# Patient Record
Sex: FEMALE | Race: BLACK OR AFRICAN AMERICAN | NOT HISPANIC OR LATINO | Employment: FULL TIME | URBAN - METROPOLITAN AREA
[De-identification: names, ages, dates, MRNs, and addresses within clinical notes are randomized per-mention and may not be internally consistent; named-entity substitution may affect disease eponyms.]

---

## 2019-07-26 ENCOUNTER — OFFICE VISIT (OUTPATIENT)
Dept: OBGYN CLINIC | Facility: CLINIC | Age: 29
End: 2019-07-26
Payer: COMMERCIAL

## 2019-07-26 VITALS
SYSTOLIC BLOOD PRESSURE: 125 MMHG | DIASTOLIC BLOOD PRESSURE: 80 MMHG | WEIGHT: 205 LBS | HEIGHT: 65 IN | BODY MASS INDEX: 34.16 KG/M2

## 2019-07-26 DIAGNOSIS — N92.6 IRREGULAR MENSES: Primary | ICD-10-CM

## 2019-07-26 DIAGNOSIS — N94.10 DYSPAREUNIA IN FEMALE: ICD-10-CM

## 2019-07-26 DIAGNOSIS — N92.1 MENORRHAGIA WITH IRREGULAR CYCLE: ICD-10-CM

## 2019-07-26 DIAGNOSIS — Z11.3 SCREENING EXAMINATION FOR STD (SEXUALLY TRANSMITTED DISEASE): ICD-10-CM

## 2019-07-26 PROCEDURE — 99203 OFFICE O/P NEW LOW 30 MIN: CPT | Performed by: NURSE PRACTITIONER

## 2019-07-26 RX ORDER — NORETHINDRONE ACETATE AND ETHINYL ESTRADIOL 1; .02 MG/1; MG/1
1 TABLET ORAL DAILY
Qty: 84 TABLET | Refills: 0 | Status: SHIPPED | OUTPATIENT
Start: 2019-07-26 | End: 2019-09-03 | Stop reason: SDUPTHER

## 2019-07-26 NOTE — PROGRESS NOTES
Assessment/Plan:    Menorrhagia with irregular cycle  Rx for lab work as well as pelvic ultrasound to assess for cause of menorrhagia with irregular cycle  Based on history and physical I suspect PCOS  Will call with results and follow up accoringly  Options reviewed to help with heavy, painful menses  Reviewed Motrin, Lysteda, contraceptives  Discussed Motrin and Lysteda may help with heaviness but will not regulate  Contraceptives will help regulate and lighten menses  Briefly discussed Metformin use in setting of PCOS to help with regulating menses  Will help with regulating but will not help with heavy, painful part of menses  Pt would like hormonal contraceptives  Reviewed birth control options including OCP, NuvaRing, Patch, Depo provera, Nexplanon  Reviewed when to start  What to do if misses pill  Recommended condom use for STD protection and back up method for at least first month after starting pill or if misses more than 2 pills in the pack  Reviewed common side effects of pill including N/V, HA, Breast Pain, Weight gain, bloating, mood swings  Reassured side effects diminished after first month or two on the pill  Reviewed clotting risk and S/S of PE, DVT, MI, and stroke  Pt to get lab work prior to starting OCP  Rx for Loestrin sent to pharmacy  RTO 3 months for annual/pill check      Screening examination for STD (sexually transmitted disease)  GC/CT testing sent from urine sample  Will call with results and follow up accordingly    Dyspareunia in female  Rx for pelvic ultrasound given  Will call with results and follow up accordingly       Diagnoses and all orders for this visit:    Irregular menses  -     CBC and differential  -     Follicle stimulating hormone  -     Insulin, fasting  -     Luteinizing hormone  -     Prolactin  -     Testosterone  -     norethindrone-ethinyl estradiol (MICROGESTIN 1/20) 1-20 MG-MCG per tablet;  Take 1 tablet by mouth daily  -     US pelvis complete w transvaginal; Future    Dyspareunia in female  -     US pelvis complete w transvaginal; Future    Menorrhagia with irregular cycle  -     US pelvis complete w transvaginal; Future    Screening examination for STD (sexually transmitted disease)  -     GP Chlamydia + Gonorrhea, Urine, Aptima          Subjective:      Patient ID: Lisa Jaquez is a 34 y o  female  Pt here for evaluation of irregular menses  Menses has always been irregular, but more recently has also become very heavy  Menses have always been every 3 months or so  Last about 4-5 days  Were of a moderate flow  Had 2-3 heavy days to where she had to change her maxi pad about every 4 hours  Menses are painful for the first day  Will have bloating and breast tenderness  Denies any pain in between menses  Denies dyspareunia until most recently  Does not take anything for menses  Previous GYN [place], [place]  Last pap smear 5/19/2018 normal (1st pap smear)  Denies any history of STD testing, 4 lifetime partners, uses condoms  LMP: 7/1/19   That menses was extremely heavy and painful  Prior to that had some spotting  Spotting occurred week or two before menses began  Was changing a pad every 2 hours for 3 heavy days  Had a lot of large blood clots as well  Was taking Aleve for pain and heavy menses, had mild relief  Currently has spotting which began yesterday along with a bunch of large blood clots that "were falling out of her" when she used the restroom  Started with some mild dyspareunia  Denies any abnormal discharge, itching, or odor vaginally  Denies any change in diet, exercise or medications  Does have excessive body hair, has some facial hair and chest hair as well  Difficulty with losing weight, and gains weight easily  Was given rx for pelvic ultrasound by PCP and advised to follow up with GYN  Suspecting PCOS   Has never been evaluated for PCOS        The following portions of the patient's history were reviewed and updated as appropriate: allergies, current medications, past family history, past medical history, past social history, past surgical history and problem list     Review of Systems   Genitourinary: Positive for menstrual problem, pelvic pain and vaginal bleeding  All other systems reviewed and are negative  Objective:      /80 (BP Location: Right arm, Patient Position: Sitting, Cuff Size: Standard)   Ht 5' 5" (1 651 m)   Wt 93 kg (205 lb)   BMI 34 11 kg/m²          Physical Exam   Constitutional: She is oriented to person, place, and time  She appears well-developed and well-nourished  Neck: Normal range of motion  Neck supple  No thyromegaly present  Cardiovascular: Normal rate, regular rhythm and normal heart sounds  Pulmonary/Chest: Effort normal and breath sounds normal    Abdominal: Soft  Bowel sounds are normal    Musculoskeletal: Normal range of motion  Neurological: She is alert and oriented to person, place, and time  Skin: Skin is warm and dry  Psychiatric: She has a normal mood and affect   Her behavior is normal  Judgment and thought content normal

## 2019-07-26 NOTE — ASSESSMENT & PLAN NOTE
Rx for lab work as well as pelvic ultrasound to assess for cause of menorrhagia with irregular cycle  Based on history and physical I suspect PCOS  Will call with results and follow up accoringly  Options reviewed to help with heavy, painful menses  Reviewed Motrin, Lysteda, contraceptives  Discussed Motrin and Lysteda may help with heaviness but will not regulate  Contraceptives will help regulate and lighten menses  Briefly discussed Metformin use in setting of PCOS to help with regulating menses  Will help with regulating but will not help with heavy, painful part of menses  Pt would like hormonal contraceptives  Reviewed birth control options including OCP, NuvaRing, Patch, Depo provera, Nexplanon  Reviewed when to start  What to do if misses pill  Recommended condom use for STD protection and back up method for at least first month after starting pill or if misses more than 2 pills in the pack  Reviewed common side effects of pill including N/V, HA, Breast Pain, Weight gain, bloating, mood swings  Reassured side effects diminished after first month or two on the pill  Reviewed clotting risk and S/S of PE, DVT, MI, and stroke  Pt to get lab work prior to starting OCP  Rx for Loestrin sent to pharmacy    RTO 3 months for annual/pill check

## 2019-07-28 LAB
C TRACH RRNA UR QL NAA+PROBE: NEGATIVE
N GONORRHOEA RRNA UR QL NAA+PROBE: NEGATIVE

## 2019-07-30 ENCOUNTER — HOSPITAL ENCOUNTER (OUTPATIENT)
Dept: ULTRASOUND IMAGING | Facility: HOSPITAL | Age: 29
Discharge: HOME | End: 2019-07-30
Payer: COMMERCIAL

## 2019-07-30 DIAGNOSIS — N92.6 IRREGULAR MENSES: ICD-10-CM

## 2019-07-30 DIAGNOSIS — N94.10 DYSPAREUNIA IN FEMALE: ICD-10-CM

## 2019-07-30 DIAGNOSIS — N92.1 MENORRHAGIA WITH IRREGULAR CYCLE: ICD-10-CM

## 2019-07-30 PROCEDURE — 76830 TRANSVAGINAL US NON-OB: CPT

## 2019-07-30 PROCEDURE — 76856 US EXAM PELVIC COMPLETE: CPT

## 2019-07-31 LAB
BASOPHILS # BLD AUTO: 30 CELLS/UL (ref 0–200)
BASOPHILS NFR BLD AUTO: 0.6 %
EOSINOPHIL # BLD AUTO: 170 CELLS/UL (ref 15–500)
EOSINOPHIL NFR BLD AUTO: 3.4 %
ERYTHROCYTE [DISTWIDTH] IN BLOOD BY AUTOMATED COUNT: 12.9 % (ref 11–15)
FSH SERPL-ACNC: 5.3 MIU/ML
HCT VFR BLD AUTO: 37.5 % (ref 35–45)
HGB BLD-MCNC: 12.1 G/DL (ref 11.7–15.5)
INSULIN SERPL-ACNC: 10.3 UIU/ML (ref 2–19.6)
LH SERPL-ACNC: 12.7 MIU/ML
LYMPHOCYTES # BLD AUTO: 1920 CELLS/UL (ref 850–3900)
LYMPHOCYTES NFR BLD AUTO: 38.4 %
MCH RBC QN AUTO: 29.2 PG (ref 27–33)
MCHC RBC AUTO-ENTMCNC: 32.3 G/DL (ref 32–36)
MCV RBC AUTO: 90.6 FL (ref 80–100)
MONOCYTES # BLD AUTO: 330 CELLS/UL (ref 200–950)
MONOCYTES NFR BLD AUTO: 6.6 %
NEUTROPHILS # BLD AUTO: 2550 CELLS/UL (ref 1500–7800)
NEUTROPHILS NFR BLD AUTO: 51 %
PLATELET # BLD AUTO: 337 THOUSAND/UL (ref 140–400)
PMV BLD REES-ECKER: 10.2 FL (ref 7.5–12.5)
PROLACTIN SERPL-MCNC: 11.5 NG/ML
RBC # BLD AUTO: 4.14 MILLION/UL (ref 3.8–5.1)
TESTOST SERPL-MCNC: 56 NG/DL (ref 2–45)
WBC # BLD AUTO: 5 THOUSAND/UL (ref 3.8–10.8)

## 2019-08-05 DIAGNOSIS — N83.201 HEMORRHAGIC CYST OF RIGHT OVARY: Primary | ICD-10-CM

## 2019-08-29 ENCOUNTER — HOSPITAL ENCOUNTER (OUTPATIENT)
Dept: RADIOLOGY | Age: 29
Discharge: HOME | End: 2019-08-29
Payer: COMMERCIAL

## 2019-08-29 DIAGNOSIS — N83.201 HEMORRHAGIC CYST OF RIGHT OVARY: ICD-10-CM

## 2019-08-29 PROCEDURE — 76830 TRANSVAGINAL US NON-OB: CPT

## 2019-08-29 PROCEDURE — 76856 US EXAM PELVIC COMPLETE: CPT

## 2019-09-03 ENCOUNTER — ANNUAL EXAM (OUTPATIENT)
Dept: OBGYN CLINIC | Facility: CLINIC | Age: 29
End: 2019-09-03
Payer: COMMERCIAL

## 2019-09-03 VITALS
HEIGHT: 65 IN | BODY MASS INDEX: 34.32 KG/M2 | DIASTOLIC BLOOD PRESSURE: 80 MMHG | SYSTOLIC BLOOD PRESSURE: 120 MMHG | WEIGHT: 206 LBS

## 2019-09-03 DIAGNOSIS — N92.6 IRREGULAR MENSES: ICD-10-CM

## 2019-09-03 DIAGNOSIS — Z01.419 ENCNTR FOR GYN EXAM (GENERAL) (ROUTINE) W/O ABN FINDINGS: Primary | ICD-10-CM

## 2019-09-03 PROCEDURE — S0612 ANNUAL GYNECOLOGICAL EXAMINA: HCPCS | Performed by: NURSE PRACTITIONER

## 2019-09-03 RX ORDER — NORETHINDRONE ACETATE AND ETHINYL ESTRADIOL 1; .02 MG/1; MG/1
1 TABLET ORAL DAILY
Qty: 84 TABLET | Refills: 3 | Status: SHIPPED | OUTPATIENT
Start: 2019-09-03 | End: 2020-09-16

## 2019-09-03 NOTE — PROGRESS NOTES
Assessment/Plan   Diagnoses and all orders for this visit:    Encntr for gyn exam (general) (routine) w/o abn findings  -     GP PAP (RFLX HPV PLUS ASC-US or >)    Irregular menses  -     norethindrone-ethinyl estradiol (MICROGESTIN 1/20) 1-20 MG-MCG per tablet; Take 1 tablet by mouth daily        Discussion    Reviewed with patient normal exam today  Pap with reflex HPV done today  Will call with pelvic ultrasound results when available  Rx for OCP Loestrin 1/20 sent to pharmacy   Normal breast exam today  Monthly SBEs advised  Vitamin D and Calcim Supplements advised  Exercise most days of the week  Follow with PCP for regular check-ups and blood work  RTO 1 year for annual or sooner as needed  Subjective     Luli Jolley is a 34 y o  female who presents for annual well woman exam    Last exam 5/19/18 Pap Normal    Pap guidelines reviewed with patient  Pt would like Pap today  Pt denies any abnormal vaginal discharge, itching, or odor  Pt in a mutually exclusive relationship with a male partner and denies the need for STD testing today  Menstrual Cycle:  LMP: 8/10/19  Period Cycle (Days): 90  Period Duration (Days): 4-5  Period Pattern: (!) Irregular  Menstrual Flow: Moderate, Heavy  Menstrual Control: Tampon, Maxi pad  Awaiting repeat pelvic ultrasound results  Had initial ultrasound in July  Repeated 8/29 due to ovarian cyst and polyp, no results in chart, will call with results and follow up  Did have some bleeding after pelvic ultrasound but otherwise WNL  OB History     G 0 P 0  Contraception: Taking OCP on first pill pack 3rd week  Some mild cramping, but otherwise no symptoms  Would like to continue  Practices monthly SBEs, no breast complaints today  Denies any bowel or bladder issues  Pt follows with PCP for regular check-ups and blood work  Review of Systems   All other systems reviewed and are negative        The following portions of the patient's history were reviewed and updated as appropriate: allergies, current medications, past family history, past medical history, past social history, past surgical history and problem list     Past Medical History:   Diagnosis Date    Disease of thyroid gland     Hyperlipidemia        No past surgical history on file      Family History   Problem Relation Age of Onset    Hypertension Mother     Diabetes Father     Breast cancer Maternal Grandmother        Social History     Socioeconomic History    Marital status: Single     Spouse name: Not on file    Number of children: Not on file    Years of education: Not on file    Highest education level: Not on file   Occupational History    Not on file   Social Needs    Financial resource strain: Not on file    Food insecurity:     Worry: Not on file     Inability: Not on file    Transportation needs:     Medical: Not on file     Non-medical: Not on file   Tobacco Use    Smoking status: Never Smoker    Smokeless tobacco: Never Used   Substance and Sexual Activity    Alcohol use: Yes     Frequency: Never    Drug use: Never    Sexual activity: Yes     Partners: Male     Birth control/protection: Condom Male   Lifestyle    Physical activity:     Days per week: Not on file     Minutes per session: Not on file    Stress: Not on file   Relationships    Social connections:     Talks on phone: Not on file     Gets together: Not on file     Attends [employer] service: Not on file     Active member of club or organization: Not on file     Attends meetings of clubs or organizations: Not on file     Relationship status: Not on file    Intimate partner violence:     Fear of current or ex partner: Not on file     Emotionally abused: Not on file     Physically abused: Not on file     Forced sexual activity: Not on file   Other Topics Concern    Not on file   Social History Narrative    Not on file         Current Outpatient Medications:     norethindrone-ethinyl estradiol ([address] [employer] [place] 1/20) 1-20 MG-MCG per tablet, Take 1 tablet by mouth daily, Disp: 84 tablet, Rfl: 3    No Known Allergies    Objective   Vitals:    09/03/19 0917   BP: 120/80   Weight: 93 4 kg (206 lb)   Height: 5' 5" (1 651 m)     Physical Exam   Constitutional: She is oriented to person, place, and time  She appears well-developed and well-nourished  HENT:   Head: Normocephalic  Neck: Normal range of motion  Neck supple  No tracheal deviation present  No thyromegaly present  Cardiovascular: Normal rate, regular rhythm and normal heart sounds  Pulmonary/Chest: Effort normal and breath sounds normal  Right breast exhibits no inverted nipple, no mass, no nipple discharge, no skin change and no tenderness  Left breast exhibits no inverted nipple, no mass, no nipple discharge, no skin change and no tenderness  No breast tenderness, discharge or bleeding  Breasts are symmetrical    Abdominal: Soft  Bowel sounds are normal  She exhibits no distension and no mass  There is no tenderness  There is no rebound and no guarding  Genitourinary: Vagina normal and uterus normal  No breast tenderness, discharge or bleeding  No labial fusion  There is no rash, tenderness, lesion or injury on the right labia  There is no rash, tenderness, lesion or injury on the left labia  Cervix exhibits no motion tenderness, no discharge and no friability  Right adnexum displays no mass, no tenderness and no fullness  Left adnexum displays no mass, no tenderness and no fullness  Musculoskeletal: Normal range of motion  Neurological: She is alert and oriented to person, place, and time  Skin: Skin is warm and dry  Psychiatric: She has a normal mood and affect   Her behavior is normal  Judgment and thought content normal

## 2019-09-09 LAB — THIN PREP CVX: NORMAL

## 2019-10-08 ENCOUNTER — PROCEDURE VISIT (OUTPATIENT)
Dept: OBGYN CLINIC | Facility: CLINIC | Age: 29
End: 2019-10-08
Payer: COMMERCIAL

## 2019-10-08 VITALS
WEIGHT: 209.4 LBS | BODY MASS INDEX: 34.89 KG/M2 | SYSTOLIC BLOOD PRESSURE: 124 MMHG | HEIGHT: 65 IN | DIASTOLIC BLOOD PRESSURE: 82 MMHG

## 2019-10-08 DIAGNOSIS — N84.0 ENDOMETRIAL POLYP: Primary | ICD-10-CM

## 2019-10-08 PROCEDURE — 58100 BIOPSY OF UTERUS LINING: CPT | Performed by: PHYSICIAN ASSISTANT

## 2019-10-08 NOTE — PROGRESS NOTES
Endometrial biopsy  Date/Time: 10/8/2019 12:21 PM  Performed by: Louise Price PA-C  Authorized by: Louise Price PA-C     Consent:     Consent obtained:  Verbal and written    Consent given by:  Patient    Procedural risks discussed:  Bleeding, damage to other organs, death, failure rate, infection and possible continued pain  Indication:     Indications: Other disorder of menstruation and other abnormal bleeding from female genital tract    Pre-procedure:     Premeds:  Ibuprofen  Procedure:     A bivalve speculum was placed in the vagina: yes      Cervix cleaned and prepped: yes      A paracervical block was performed: no      An intracervical block was performed: no      The cervix was dilated: no      Uterus sounded: yes      Uterus sound depth (cm):  7    Unable to perform due to: pain    Comments:      I attempted to apply both and Allis and a single tooth tenaculum to the cervix  Pt was unable to tolerate the pain of either  I was able to pass a sound through the cx but when I attempted to pass the pipelle without counter tractio I was unable to pass the Pipelle  I offered to attempt to place countertraction again but pt declined and desires to terminate procedure  Pt aware we will now need to plan D&C  Pt agrees with this plan

## 2019-11-11 ENCOUNTER — OFFICE VISIT (OUTPATIENT)
Dept: OBGYN CLINIC | Facility: CLINIC | Age: 29
End: 2019-11-11
Payer: COMMERCIAL

## 2019-11-11 VITALS
WEIGHT: 207 LBS | DIASTOLIC BLOOD PRESSURE: 72 MMHG | HEIGHT: 65 IN | BODY MASS INDEX: 34.49 KG/M2 | SYSTOLIC BLOOD PRESSURE: 126 MMHG

## 2019-11-11 DIAGNOSIS — N84.0 ENDOMETRIAL POLYP: Primary | ICD-10-CM

## 2019-11-11 DIAGNOSIS — N92.6 IRREGULAR MENSES: ICD-10-CM

## 2019-11-11 PROCEDURE — 99213 OFFICE O/P EST LOW 20 MIN: CPT | Performed by: OBSTETRICS & GYNECOLOGY

## 2019-11-11 NOTE — PROGRESS NOTES
Subjective     Davon Rosado is a 34 y o   female here for a problem visit  Patient has stenotic cervix and was unable to tolerate office endometrial biopsy  Patient had some irregular bleeding somewhat improved on OCP though still having heavy crampy periods as well as findings of endometrial polyps suspected on ultrasound  Reviewed and discussed with patient and at her request her cousin was called and put on speaker phone to help assist with questions  After thorough discussion of patient's health history as well as reasons for [facility]  hysteroscopy procedure risks and benefits patient desires to proceed  We will also follow up on thyroid testing, patient had noted difficulty losing weight and has actually gained weight over time despite trying to eat healthy and exercise  Discussed PCO in relation to fertility and weight gain and risk factors  There is also significant family history of endometriosis  Discussed controlling menses with OCP to hopefully also impact future risk of endometriosis and fertility as well as controlling cycles  Surgical instructions given  Personal health questionnaire reviewed: yes  Gynecologic History  No LMP recorded  Contraception: OCP (estrogen/progesterone)  Last Pap:  2019   Results were: normal      Obstetric History  OB History    Para Term  AB Living   0 0 0 0 0 0   SAB TAB Ectopic Multiple Live Births   0 0 0 0 0     Past Medical History:   Diagnosis Date    Disease of thyroid gland     Hyperlipidemia      Past Surgical History:   Procedure Laterality Date    WISDOM TOOTH EXTRACTION         Current Outpatient Medications:     norethindrone-ethinyl estradiol (MICROGESTIN ) 1-20 MG-MCG per tablet, Take 1 tablet by mouth daily, Disp: 84 tablet, Rfl: 3  No Known Allergies    Social History     Tobacco Use    Smoking status: Never Smoker    Smokeless tobacco: Never Used   Substance Use Topics    Alcohol use: Yes     Comment: social  Drug use: Never           Review of Systems  Review of Systems   Constitutional: Negative for chills, fatigue, fever and unexpected weight change  HENT: Negative for dental problem, sinus pressure and sinus pain  Eyes: Negative for visual disturbance  Respiratory: Negative for cough, shortness of breath and wheezing  Cardiovascular: Negative for chest pain and leg swelling  Gastrointestinal: Negative for constipation, diarrhea, nausea and vomiting  Genitourinary: Negative for menstrual problem, pelvic pain and urgency  Musculoskeletal: Negative for back pain  Allergic/Immunologic: Negative for environmental allergies  Neurological: Negative for dizziness and headaches  Objective     /72 (BP Location: Right arm, Patient Position: Sitting, Cuff Size: Standard)   Ht 5' 5" (1 651 m)   Wt 93 9 kg (207 lb)   LMP 10/29/2019   BMI 34 45 kg/m²   General appearance: alert and oriented, in no acute distress  Lungs: clear to auscultation bilaterally  Heart: regular rate and rhythm, S1, S2 normal, no murmur, click, rub or gallop  Abdomen: soft, non-tender; bowel sounds normal; no masses,  no organomegaly      Assessment  34year-old  with irregular menses somewhat controlled on OCP still with cramping and heaviness of flow also with weight gain and history of polycystic ovarian disease  Plan  D&C hysteroscopy  Will give slip for TSH, free T4, TPO AB  Patient desires [facility] if possible as this is the campus she is familiar with  I have spent 30 minutes with Patient and family today in which greater than 50% of this time was spent in counseling/coordination of care regarding Diagnostic results, Prognosis, Risks and benefits of tx options, Intructions for management and Risk factor reductions

## 2019-11-12 ENCOUNTER — TELEPHONE (OUTPATIENT)
Dept: OBGYN CLINIC | Facility: CLINIC | Age: 29
End: 2019-11-12

## 2019-11-12 PROBLEM — N84.0 ENDOMETRIAL POLYP: Status: ACTIVE | Noted: 2019-11-12

## 2019-11-12 NOTE — TELEPHONE ENCOUNTER
Via Peggy, checked benefits  Active insurance 1/1/19-12/31/19  10% coinsurance, $400 deductible, $0 remain  $2000 OOP, $1440 06 remain  No precert required

## 2019-11-13 ENCOUNTER — ANESTHESIA EVENT (OUTPATIENT)
Dept: PERIOP | Facility: AMBULARY SURGERY CENTER | Age: 29
End: 2019-11-13
Payer: COMMERCIAL

## 2019-11-13 NOTE — PRE-PROCEDURE INSTRUCTIONS
Pre-Surgery Instructions:   Medication Instructions    norethindrone-ethinyl estradiol (MICROGESTIN 1/20) 1-20 MG-MCG per tablet Instructed patient per Anesthesia Guidelines  Preop,medications and showering instructions using an antibacterial soap reviewed

## 2019-11-14 PROCEDURE — 99024 POSTOP FOLLOW-UP VISIT: CPT | Performed by: OBSTETRICS & GYNECOLOGY

## 2019-11-15 LAB
T4 FREE SERPL-MCNC: 0.9 NG/DL (ref 0.8–1.8)
THYROPEROXIDASE AB SERPL-ACNC: <1 IU/ML
TSH SERPL-ACNC: 4.34 MIU/L

## 2019-11-15 NOTE — H&P
Antonio Louie is a 57-year-old  with with stenotic cervix unable to tolerate endometrial biopsy in office  Patient has irregular bleeding and suspected endometrial polyps on ultrasound  Patient has history of polycystic ovary and family history of endometriosis  Patient and cousin on phone were thoroughly counseled regarding procedure risks benefits of D&C hysteroscopy also fully counseled on PCO and family history of endometriosis  Patient reports no further questions and desires to proceed  Patient was given surgical instructions  Contraception OCP  Last Pap 2019 normal    Past Medical History:   Diagnosis Date    Disease of thyroid gland     Hyperlipidemia      Past Surgical History:   Procedure Laterality Date    WISDOM TOOTH EXTRACTION       No current facility-administered medications for this encounter  Current Outpatient Medications:     norethindrone-ethinyl estradiol (MICROGESTIN ) 1-20 MG-MCG per tablet, Take 1 tablet by mouth daily (Patient taking differently: Take 1 tablet by mouth daily at bedtime ), Disp: 84 tablet, Rfl: 3    OB History        0    Para   0    Term   0       0    AB   0    Living   0       SAB   0    TAB   0    Ectopic   0    Multiple   0    Live Births   0               No Known Allergies  Social History     Tobacco Use    Smoking status: Never Smoker    Smokeless tobacco: Never Used   Substance Use Topics    Alcohol use: Yes     Frequency: 2-4 times a month     Drinks per session: 1 or 2     Binge frequency: Never     Comment: socially    Drug use: Never       Physical exam:  5 ft 5 in, weight 207 lb (93 9 kg) BMI 34 45, /72  Lungs:  CTAB  Heart:  RRR S1-S2  Abdomen:  Soft nontender positive bowel sounds no organomegaly    Assessment:  57-year-old  with irregular menses somewhat controlled on OCP use still with cramping and heavy flow and history of PCO      Plan: D&C hysteroscopy patient also given slip for TSH free T4 TPO AB, surgical instructions given

## 2019-11-19 ENCOUNTER — HOSPITAL ENCOUNTER (OUTPATIENT)
Facility: AMBULARY SURGERY CENTER | Age: 29
Setting detail: OUTPATIENT SURGERY
Discharge: HOME | End: 2019-11-19
Attending: OBSTETRICS & GYNECOLOGY | Admitting: OBSTETRICS & GYNECOLOGY
Payer: COMMERCIAL

## 2019-11-19 ENCOUNTER — ANESTHESIA (OUTPATIENT)
Dept: PERIOP | Facility: AMBULARY SURGERY CENTER | Age: 29
End: 2019-11-19
Payer: COMMERCIAL

## 2019-11-19 VITALS
OXYGEN SATURATION: 99 % | RESPIRATION RATE: 20 BRPM | SYSTOLIC BLOOD PRESSURE: 128 MMHG | TEMPERATURE: 97.5 F | BODY MASS INDEX: 33.99 KG/M2 | HEIGHT: 65 IN | DIASTOLIC BLOOD PRESSURE: 82 MMHG | HEART RATE: 86 BPM | WEIGHT: 204 LBS

## 2019-11-19 DIAGNOSIS — N84.0 ENDOMETRIAL POLYP: ICD-10-CM

## 2019-11-19 LAB
EXT PREGNANCY TEST URINE: NEGATIVE
EXT. CONTROL: NORMAL

## 2019-11-19 PROCEDURE — 58558 HYSTEROSCOPY BIOPSY: CPT | Performed by: OBSTETRICS & GYNECOLOGY

## 2019-11-19 PROCEDURE — 81025 URINE PREGNANCY TEST: CPT | Performed by: OBSTETRICS & GYNECOLOGY

## 2019-11-19 PROCEDURE — 88305 TISSUE EXAM BY PATHOLOGIST: CPT | Performed by: PATHOLOGY

## 2019-11-19 RX ORDER — PROMETHAZINE HYDROCHLORIDE 25 MG/ML
12.5 INJECTION, SOLUTION INTRAMUSCULAR; INTRAVENOUS ONCE AS NEEDED
Status: DISCONTINUED | OUTPATIENT
Start: 2019-11-19 | End: 2019-11-19 | Stop reason: HOSPADM

## 2019-11-19 RX ORDER — ALBUTEROL SULFATE 2.5 MG/3ML
2.5 SOLUTION RESPIRATORY (INHALATION) ONCE AS NEEDED
Status: DISCONTINUED | OUTPATIENT
Start: 2019-11-19 | End: 2019-11-19 | Stop reason: HOSPADM

## 2019-11-19 RX ORDER — LABETALOL 20 MG/4 ML (5 MG/ML) INTRAVENOUS SYRINGE
5
Status: DISCONTINUED | OUTPATIENT
Start: 2019-11-19 | End: 2019-11-19 | Stop reason: HOSPADM

## 2019-11-19 RX ORDER — ONDANSETRON 2 MG/ML
4 INJECTION INTRAMUSCULAR; INTRAVENOUS EVERY 6 HOURS PRN
Status: DISCONTINUED | OUTPATIENT
Start: 2019-11-19 | End: 2019-11-19 | Stop reason: HOSPADM

## 2019-11-19 RX ORDER — SODIUM CHLORIDE, SODIUM LACTATE, POTASSIUM CHLORIDE, CALCIUM CHLORIDE 600; 310; 30; 20 MG/100ML; MG/100ML; MG/100ML; MG/100ML
125 INJECTION, SOLUTION INTRAVENOUS CONTINUOUS
Status: DISCONTINUED | OUTPATIENT
Start: 2019-11-19 | End: 2019-11-19

## 2019-11-19 RX ORDER — ACETAMINOPHEN 325 MG/1
650 TABLET ORAL EVERY 4 HOURS PRN
Status: DISCONTINUED | OUTPATIENT
Start: 2019-11-19 | End: 2019-11-19 | Stop reason: HOSPADM

## 2019-11-19 RX ORDER — MEPERIDINE HYDROCHLORIDE 25 MG/ML
12.5 INJECTION INTRAMUSCULAR; INTRAVENOUS; SUBCUTANEOUS AS NEEDED
Status: DISCONTINUED | OUTPATIENT
Start: 2019-11-19 | End: 2019-11-19 | Stop reason: HOSPADM

## 2019-11-19 RX ORDER — FENTANYL CITRATE 50 UG/ML
INJECTION, SOLUTION INTRAMUSCULAR; INTRAVENOUS AS NEEDED
Status: DISCONTINUED | OUTPATIENT
Start: 2019-11-19 | End: 2019-11-19 | Stop reason: SURG

## 2019-11-19 RX ORDER — FENTANYL CITRATE/PF 50 MCG/ML
25 SYRINGE (ML) INJECTION
Status: DISCONTINUED | OUTPATIENT
Start: 2019-11-19 | End: 2019-11-19 | Stop reason: HOSPADM

## 2019-11-19 RX ORDER — ACETAMINOPHEN 325 MG/1
975 TABLET ORAL ONCE
Status: COMPLETED | OUTPATIENT
Start: 2019-11-19 | End: 2019-11-19

## 2019-11-19 RX ORDER — PROPOFOL 10 MG/ML
INJECTION, EMULSION INTRAVENOUS AS NEEDED
Status: DISCONTINUED | OUTPATIENT
Start: 2019-11-19 | End: 2019-11-19 | Stop reason: SURG

## 2019-11-19 RX ORDER — HYDROMORPHONE HCL/PF 1 MG/ML
0.5 SYRINGE (ML) INJECTION
Status: DISCONTINUED | OUTPATIENT
Start: 2019-11-19 | End: 2019-11-19 | Stop reason: HOSPADM

## 2019-11-19 RX ORDER — MIDAZOLAM HYDROCHLORIDE 2 MG/2ML
INJECTION, SOLUTION INTRAMUSCULAR; INTRAVENOUS AS NEEDED
Status: DISCONTINUED | OUTPATIENT
Start: 2019-11-19 | End: 2019-11-19 | Stop reason: SURG

## 2019-11-19 RX ORDER — LIDOCAINE HYDROCHLORIDE 10 MG/ML
INJECTION, SOLUTION INFILTRATION; PERINEURAL AS NEEDED
Status: DISCONTINUED | OUTPATIENT
Start: 2019-11-19 | End: 2019-11-19 | Stop reason: SURG

## 2019-11-19 RX ORDER — DIPHENHYDRAMINE HYDROCHLORIDE 50 MG/ML
25 INJECTION INTRAMUSCULAR; INTRAVENOUS EVERY 6 HOURS PRN
Status: DISCONTINUED | OUTPATIENT
Start: 2019-11-19 | End: 2019-11-19 | Stop reason: HOSPADM

## 2019-11-19 RX ORDER — ONDANSETRON 2 MG/ML
4 INJECTION INTRAMUSCULAR; INTRAVENOUS ONCE AS NEEDED
Status: DISCONTINUED | OUTPATIENT
Start: 2019-11-19 | End: 2019-11-19 | Stop reason: HOSPADM

## 2019-11-19 RX ORDER — KETOROLAC TROMETHAMINE 30 MG/ML
INJECTION, SOLUTION INTRAMUSCULAR; INTRAVENOUS AS NEEDED
Status: DISCONTINUED | OUTPATIENT
Start: 2019-11-19 | End: 2019-11-19 | Stop reason: SURG

## 2019-11-19 RX ORDER — IBUPROFEN 600 MG/1
600 TABLET ORAL EVERY 6 HOURS PRN
Status: DISCONTINUED | OUTPATIENT
Start: 2019-11-19 | End: 2019-11-19 | Stop reason: HOSPADM

## 2019-11-19 RX ORDER — GLYCOPYRROLATE 0.2 MG/ML
INJECTION INTRAMUSCULAR; INTRAVENOUS AS NEEDED
Status: DISCONTINUED | OUTPATIENT
Start: 2019-11-19 | End: 2019-11-19 | Stop reason: SURG

## 2019-11-19 RX ORDER — DEXAMETHASONE SODIUM PHOSPHATE 4 MG/ML
INJECTION, SOLUTION INTRA-ARTICULAR; INTRALESIONAL; INTRAMUSCULAR; INTRAVENOUS; SOFT TISSUE AS NEEDED
Status: DISCONTINUED | OUTPATIENT
Start: 2019-11-19 | End: 2019-11-19 | Stop reason: SURG

## 2019-11-19 RX ORDER — ONDANSETRON 2 MG/ML
INJECTION INTRAMUSCULAR; INTRAVENOUS AS NEEDED
Status: DISCONTINUED | OUTPATIENT
Start: 2019-11-19 | End: 2019-11-19 | Stop reason: SURG

## 2019-11-19 RX ORDER — LIDOCAINE HYDROCHLORIDE 10 MG/ML
0.5 INJECTION, SOLUTION EPIDURAL; INFILTRATION; INTRACAUDAL; PERINEURAL ONCE AS NEEDED
Status: DISCONTINUED | OUTPATIENT
Start: 2019-11-19 | End: 2019-11-19

## 2019-11-19 RX ADMIN — ACETAMINOPHEN 975 MG: 325 TABLET, FILM COATED ORAL at 09:08

## 2019-11-19 RX ADMIN — GLYCOPYRROLATE 0.2 MG: 0.2 INJECTION, SOLUTION INTRAMUSCULAR; INTRAVENOUS at 09:25

## 2019-11-19 RX ADMIN — PROPOFOL 200 MG: 10 INJECTION, EMULSION INTRAVENOUS at 09:30

## 2019-11-19 RX ADMIN — DEXAMETHASONE SODIUM PHOSPHATE 4 MG: 4 INJECTION, SOLUTION INTRAMUSCULAR; INTRAVENOUS at 09:34

## 2019-11-19 RX ADMIN — KETOROLAC TROMETHAMINE 30 MG: 30 INJECTION, SOLUTION INTRAMUSCULAR at 10:00

## 2019-11-19 RX ADMIN — LIDOCAINE HYDROCHLORIDE 50 MG: 10 INJECTION, SOLUTION INFILTRATION; PERINEURAL at 09:30

## 2019-11-19 RX ADMIN — FENTANYL CITRATE 25 MCG: 50 INJECTION, SOLUTION INTRAMUSCULAR; INTRAVENOUS at 09:55

## 2019-11-19 RX ADMIN — FENTANYL CITRATE 25 MCG: 50 INJECTION, SOLUTION INTRAMUSCULAR; INTRAVENOUS at 09:38

## 2019-11-19 RX ADMIN — MIDAZOLAM HYDROCHLORIDE 2 MG: 1 INJECTION, SOLUTION INTRAMUSCULAR; INTRAVENOUS at 09:25

## 2019-11-19 RX ADMIN — SODIUM CHLORIDE, SODIUM LACTATE, POTASSIUM CHLORIDE, AND CALCIUM CHLORIDE: .6; .31; .03; .02 INJECTION, SOLUTION INTRAVENOUS at 09:21

## 2019-11-19 RX ADMIN — FENTANYL CITRATE 25 MCG: 50 INJECTION, SOLUTION INTRAMUSCULAR; INTRAVENOUS at 09:42

## 2019-11-19 RX ADMIN — FENTANYL CITRATE 50 MCG: 50 INJECTION, SOLUTION INTRAMUSCULAR; INTRAVENOUS at 09:34

## 2019-11-19 RX ADMIN — ONDANSETRON 4 MG: 2 INJECTION INTRAMUSCULAR; INTRAVENOUS at 09:25

## 2019-11-19 RX ADMIN — FENTANYL CITRATE 25 MCG: 50 INJECTION, SOLUTION INTRAMUSCULAR; INTRAVENOUS at 10:00

## 2019-11-19 RX ADMIN — FENTANYL CITRATE 50 MCG: 50 INJECTION, SOLUTION INTRAMUSCULAR; INTRAVENOUS at 09:49

## 2019-11-19 NOTE — DISCHARGE INSTRUCTIONS
Dilation and Curettage   WHAT YOU NEED TO KNOW:   Dilation and curettage (D&C) is a procedure to remove tissue from the lining of your uterus  DISCHARGE INSTRUCTIONS:   Call 911 for any of the following:   · You have signs of an allergic reaction, such as hives, trouble breathing, or severe swelling  Seek care immediately if:   · You have heavy vaginal bleeding that soaks 1 pad in 1 hour for 2 hours in a row  · You have a fever higher than 100 4°F (38°C)  · You have abdominal cramps for more than 2 days  · Your pain does not get better, even after treatment  Contact your healthcare provider if:   · You have foul-smelling vaginal discharge  · You do not get your monthly period  · You feel depressed or anxious  · You feel very tired and weak  · You have questions or concerns about your condition or care  Medicines: You may need any of the following:  · Prescription pain medicine  may be given  Do not wait until the pain is severe before you take your medicine  Ask your healthcare provider how to take this medicine safely  · Antibiotics  help fight or prevent a bacterial infection  · Take your medicine as directed  Contact your healthcare provider if you think your medicine is not helping or if you have side effects  Tell him or her if you are allergic to any medicine  Keep a list of the medicines, vitamins, and herbs you take  Include the amounts, and when and why you take them  Bring the list or the pill bottles to follow-up visits  Carry your medicine list with you in case of an emergency  Self-care:   · Use sanitary pads if needed  You may have light bleeding for up to 2 weeks  Do not use tampons  Use sanitary pads instead  This will help prevent a vaginal infection  · Rest as needed  Slowly start to do more each day  Return to your daily activities as directed  · Do not have sex for at least 2 weeks after the procedure    This will help prevent an infection  · Use birth control right after your procedure  Your monthly period should start again in 4 to 8 weeks  During this time, you could still ovulate (release an egg)  Use birth control as directed to prevent pregnancy during this time  Follow up with your healthcare provider as directed:  Write down your questions so you remember to ask them during your visits  © 2017 [employer]  Information is for End User's use only and may not be sold, redistributed or otherwise used for commercial purposes  All illustrations and images included in CareNotes® are the copyrighted property of [employer]  or Jose Alberto Delgado  The above information is an  only  It is not intended as medical advice for individual conditions or treatments  Talk to your doctor, nurse or pharmacist before following any medical regimen to see if it is safe and effective for you

## 2019-11-19 NOTE — ANESTHESIA PREPROCEDURE EVALUATION
Review of Systems/Medical History  Patient summary reviewed        Cardiovascular  Negative cardio ROS Exercise tolerance (METS): >4,  Hyperlipidemia,    Pulmonary  Negative pulmonary ROS        GI/Hepatic  Negative GI/hepatic ROS          Negative  ROS        Endo/Other  Negative endo/other ROS History of thyroid disease , hypothyroidism,      GYN  Negative gynecology ROS          Hematology  Negative hematology ROS      Musculoskeletal  Negative musculoskeletal ROS        Neurology  Negative neurology ROS      Psychology   Negative psychology ROS              Physical Exam    Airway    Mallampati score: II  TM Distance: >3 FB  Neck ROM: full     Dental   No notable dental hx     Cardiovascular  Comment: Negative ROS,     Pulmonary      Other Findings        Anesthesia Plan  ASA Score- 2     Anesthesia Type- general with ASA Monitors  Additional Monitors:   Airway Plan: LMA  Comment: Patient seen and examined, history reviewed  Patient to be done under general anesthesia with LMA and routine monitors  Risks discussed with the patient, consent obtained        Plan Factors-    Induction- intravenous  Postoperative Plan-     Informed Consent- Anesthetic plan and risks discussed with patient  I personally reviewed this patient with the CRNA  Discussed and agreed on the Anesthesia Plan with the CRNA  Andrei Whitt

## 2019-11-19 NOTE — INTERVAL H&P NOTE
H&P reviewed  After examining the patient I find no changes in the patients condition since the H&P had been written      Vitals:    11/19/19 0904   BP: 133/83   Pulse: 97   Resp: 17   Temp: (!) 97 °F (36 1 °C)   SpO2: 97%

## 2019-11-19 NOTE — ANESTHESIA POSTPROCEDURE EVALUATION
Post-Op Assessment Note    CV Status:  Stable  Pain Score: 0    Pain management: adequate     Mental Status:  Alert and awake   Hydration Status:  Euvolemic   PONV Controlled:  Controlled   Airway Patency:  Patent   Post Op Vitals Reviewed: Yes      Staff: CRNA           /90 (11/19/19 1011)    Temp (!) 97 1 °F (36 2 °C) (11/19/19 1011)    Pulse 105 (11/19/19 1011)   Resp 16 (11/19/19 1011)    SpO2 97 % (11/19/19 1011)

## 2019-11-19 NOTE — OP NOTE
OPERATIVE REPORT  PATIENT NAME: Erika Allred    :  1990  MRN: [id number]  Pt Location: [facility] OR ROOM 05    SURGERY DATE: 2019    Surgeon(s) and Role:     * Felisha Francisco MD - Primary     * Yvon Bauer MD - Assisting    Preop Diagnosis:  Endometrial polyp [N84 0]    Post-Op Diagnosis Codes:     * Endometrial polyp [N84 0]    Procedure(s) (LRB):  DILATATION AND CURETTAGE (D&C) WITH HYSTEROSCOPY (N/A)    Specimen(s):  ID Type Source Tests Collected by Time Destination   1 :  Tissue Polyp, Cervical/Endometrial TISSUE Grace Chin MD 2019 [id number]    2 : Endometrial curettings Tissue Endometrium TISSUE EXAM Felisha Francisco MD 2019 [id number]        Estimated Blood Loss:   Minimal    Drains:  * No LDAs found *    Anesthesia Type:   General    Operative Indications:  Endometrial polyp [N84 0]      Operative Findings:  One central fundal endometrial polyp,  Additional smaller polyp off to patient's right,  Normal bilateral ostia normal uterine cavity normal cervical canal    Uterus sounded to 9 cm  fluid deficit 0 mL  Complications:   None    Procedure and Technique:  Walter Granger was identified as herself in the preop holding area, she was taken back to the operating room  and placed under general anesthesia  Patient was placed in dorsal lithotomy position and prepped and draped in standard fashion for a vaginal case  Appropriate time-out was performed and case was begun  Exam under anesthesia  Revealed anteverted uterus with no palpable adnexal masses  A [place] was placed the posterior vaginal fornix and a [place] was placed anteriorly  A single-tooth tenaculum was placed at 12:00 position on the cervix and the uterus was gently sounded to 9 cm  Cervix was gently dilated to a size 19 Elder and the hysteroscope was introduced with the fluid management system     Initial findings were as noted there was a polyp noted in the central cavity as well as a smaller polyp to the patient's right  The MyoSure reached device was used to excise both polyps  Excellent hemostasis was seen after that procedure  Gentle curetting was then performed circumferentially throughout the uterine cavity and specimens were then sent separately  Good hemostasis was seen fluid deficit was noted to be 0 mL  Patient was thoroughly cleansed the tenaculum sites achieved hemostasis with some pressure from Allis clamps  The patient was thoroughly cleansed allowed awaken and taken to recovery room stable condition     I was present for the entire procedure    Patient Disposition:  PACU  and extubated and stable    SIGNATURE: Cristhian Kumari MD  DATE: November 19, 2019  TIME: 10:23 AM

## 2019-11-21 ENCOUNTER — TELEPHONE (OUTPATIENT)
Dept: OBGYN CLINIC | Facility: CLINIC | Age: 29
End: 2019-11-21

## 2019-11-25 ENCOUNTER — OFFICE VISIT (OUTPATIENT)
Dept: OBGYN CLINIC | Facility: CLINIC | Age: 29
End: 2019-11-25

## 2019-11-25 VITALS
BODY MASS INDEX: 34.82 KG/M2 | HEIGHT: 65 IN | WEIGHT: 209 LBS | SYSTOLIC BLOOD PRESSURE: 118 MMHG | DIASTOLIC BLOOD PRESSURE: 72 MMHG

## 2019-11-25 DIAGNOSIS — N84.0 ENDOMETRIAL POLYP: Primary | ICD-10-CM

## 2019-11-25 PROCEDURE — 99024 POSTOP FOLLOW-UP VISIT: CPT | Performed by: OBSTETRICS & GYNECOLOGY

## 2019-11-25 NOTE — PROGRESS NOTES
Subjective     Rod Figueroa is a 34 y o   female here 1 week post D&C hysteroscopy  Reviewed with patient pathology endometrial polyp  Patient reports she has been having menstrual like flow with cramping which is slowly beginning to improve  Patient also noted some dizziness she was doing dishes a few days ago and felt like she was going to pass out  Discussed reviewed with patient hydration and that some of these may be post anesthesia response advised decongestants as needed and to hydrate well  Advised patient that if this is not resolving over this next week that she should call and that we may have her see a primary physician  Also discussed that as she is due for her  Week on OCP that she should start her next pack right away  Patient does report her bleeding picked up after doing some heavy lifting 2 days postop  She also discussed the hair on her chin which after the 3 months on OCP is not improved sufficiently for her preference  Discussed and reviewed that she may get some continued benefit but I would recommend that she see Dermatology to further address this I did briefly discussed that some of the other options that may be offered include spironolactone and or vaniqua or electrolysis  Personal health questionnaire reviewed: yes  Gynecologic History  Patient's last menstrual period was 10/29/2019  Contraception: OCP (estrogen/progesterone)  Last Pap:  2019   Results were: normal      Obstetric History  OB History    Para Term  AB Living   0 0 0 0 0 0   SAB TAB Ectopic Multiple Live Births   0 0 0 0 0         The following portions of the patient's history were reviewed and updated as appropriate: allergies, current medications, past family history, past medical history, past social history, past surgical history and problem list     Review of Systems  Review of Systems     Objective     /72 (BP Location: Right arm, Patient Position: Sitting, Cuff Size: Standard)   Ht 5' 5" (1 651 m)   Wt 94 8 kg (209 lb)   LMP 10/29/2019   BMI 34 78 kg/m²   General appearance: alert and oriented, in no acute distress  Lungs: clear to auscultation bilaterally  Heart: regular rate and rhythm, S1, S2 normal, no murmur, click, rub or gallop  Abdomen: soft, non-tender; bowel sounds normal; no masses,  no organomegaly      Assessment  34year-old  1 week post D&C hysteroscopy with some residual bleeding and cramping as well as some dizziness  Plan  Advised patient to hydrate well and call if her symptoms do resolve also asked patient to start her new pack of OCP at this time instead of having withdrawal week and call if bleeding cramping do not resolve as well    Reviewed pathology with patient asked to call with any concerns or otherwise return for her annual exam

## 2020-07-15 ENCOUNTER — OFFICE VISIT (OUTPATIENT)
Dept: INTERNAL MEDICINE CLINIC | Age: 30
End: 2020-07-15
Payer: COMMERCIAL

## 2020-07-15 DIAGNOSIS — Z00.00 ANNUAL PHYSICAL EXAM: Primary | ICD-10-CM

## 2020-07-15 DIAGNOSIS — E66.01 CLASS 3 SEVERE OBESITY DUE TO EXCESS CALORIES WITHOUT SERIOUS COMORBIDITY WITH BODY MASS INDEX (BMI) OF 40.0 TO 44.9 IN ADULT (HCC): ICD-10-CM

## 2020-07-15 DIAGNOSIS — F32.9 REACTIVE DEPRESSION: ICD-10-CM

## 2020-07-15 DIAGNOSIS — F41.0 PANIC ATTACKS: ICD-10-CM

## 2020-07-15 DIAGNOSIS — F41.1 GENERALIZED ANXIETY DISORDER: ICD-10-CM

## 2020-07-15 PROBLEM — E66.813 CLASS 3 SEVERE OBESITY WITHOUT SERIOUS COMORBIDITY WITH BODY MASS INDEX (BMI) OF 40.0 TO 44.9 IN ADULT (HCC): Status: ACTIVE | Noted: 2020-07-15

## 2020-07-15 PROCEDURE — 99385 PREV VISIT NEW AGE 18-39: CPT | Performed by: INTERNAL MEDICINE

## 2020-07-15 PROCEDURE — 3008F BODY MASS INDEX DOCD: CPT | Performed by: INTERNAL MEDICINE

## 2020-07-15 PROCEDURE — 99203 OFFICE O/P NEW LOW 30 MIN: CPT | Performed by: INTERNAL MEDICINE

## 2020-07-15 PROCEDURE — 1036F TOBACCO NON-USER: CPT | Performed by: INTERNAL MEDICINE

## 2020-07-15 NOTE — PROGRESS NOTES
Assessment/Plan:    Annual physical exam  - History and physical examination done  - Pt was counseled to eat a heart healthy diet, to drink at least 2 L of water daily, to take a daily multivitamin and to exercise for at least 30 minutes of cardio exercise daily, for at least 5 days a week  - CBC, CMP, TSH and lipid panel have been ordered and will follow up with the results of her labs  - patient needs to tetanus vaccine and will return at a later visit for the shot   - follow up in 2 weeks to 4 weeks    Morbid Obesity  -diet and exercise counseling given    BMI Counseling: Body mass index is 41 6 kg/m²  The BMI is above normal  Nutrition recommendations include encouraging healthy choices of fruits and vegetables, consuming healthier snacks, moderation in carbohydrate intake and reducing intake of saturated and trans fat  Exercise recommendations include moderate physical activity 150 minutes/week  No pharmacotherapy was ordered  Patient referred to PCP due to patient being overweight  Diagnoses and all orders for this visit:    Annual physical exam  -     CBC and differential; Future  -     Comprehensive metabolic panel; Future  -     TSH, 3rd generation with Free T4 reflex; Future  -     Lipid panel; Future    Class 3 severe obesity due to excess calories without serious comorbidity with body mass index (BMI) of 40 0 to 44 9 in adult [facility])    Reactive depression  -     Ambulatory referral to Psychology; Future  -     TSH, 3rd generation with Free T4 reflex; Future    Generalized anxiety disorder  -     Ambulatory referral to Psychology; Future  -     TSH, 3rd generation with Free T4 reflex; Future    Panic attacks  -     Ambulatory referral to Psychology; Future  -     TSH, 3rd generation with Free T4 reflex; Future          Subjective:      Patient ID: Varghese Weber is a 31 y o  female      HPI  Patient presents for the 1st time to our practice for an annual physical exam   She states that she does not have a PCP and will return at a later date for an establish care visit  She also has some complaints and would like to get a referral to psychotherapy  Last annual physical exam- last year    Past medical history- endometrial polyp, hyperlipidemia, seasonal allergies, PCOS  Past surgical history- D and C for endometrial polyps    Medications- OCP,   Allergies- NKDA    Diet- a mixture of balanced and junk, drinks about 5 16 oz bottles of water daily  Exercise- for about two hours a week on average    Alcohol use- twice a week,  maximum of one drink a day  Caffeine and soda use- drinks about a cup week of tea, drinks soda about twice a week  Nicotine use- never  Recreational drug use-never    Work- a   Sexual history, STD history and HIV testing- monogamous with one male partner, std hx - never, HIV testing - last testing last year    Gynecological history/Prostate health/testicular health history- LMP -  Last month, was not regular till she was placed on OCP, , last pap smear - , normal  Colonoscopy- N/A    Immunization history- last tetanus - , due for tetanus vaccine  Dental visit- every six months  Vision- Myopia, uses spectacles    Family history- grandma(mom)- breast cancer, fibrocystic ds of the breast- mom, htn - parents, dm - dad,     Today, patient complains of feelings of depression that has been going on at a low level for many years in her life with acute worsening which she believes started since she was started on oral contraceptive pills for PCOS  She admits to feelings of depression, poor interest, stress eating, weight gain of about 30 lb in a year and half, feelings of anxiety, panic attacks, feelings of hopelessness and guilt, fatigue and suicidal ideation but she states that she does not have a plan to commit suicide and would not take her life because she cannot take pain    She admits to occasional headaches but denies fever, chills, night sweats, chest pain, shortness of breath, palpitations(except when she has panic attacks that usually wake her from sleep  She denies nightmares ), abdominal pain, diarrhea, constipation, myalgias, arthralgias  The following portions of the patient's history were reviewed and updated as appropriate:   She  has a past medical history of Disease of thyroid gland and Hyperlipidemia  She   Patient Active Problem List    Diagnosis Date Noted    Annual physical exam 07/15/2020    Class 3 severe obesity without serious comorbidity with body mass index (BMI) of 40 0 to 44 9 in adult [facility]) 07/15/2020    Reactive depression 07/15/2020    Generalized anxiety disorder 07/15/2020    Panic attacks 07/15/2020    Endometrial polyp 11/12/2019    Menorrhagia with irregular cycle 07/26/2019    Irregular menses 07/26/2019    Screening examination for STD (sexually transmitted disease) 07/26/2019    Dyspareunia in female 07/26/2019     She  has a past surgical history that includes [place] tooth extraction and pr hysteroscopy,w/endo bx (N/A, 11/19/2019)  Her family history includes Alcohol abuse in her maternal grandfather; Arthritis in her maternal grandmother; Breast cancer in her maternal grandmother; Cancer in her maternal grandmother; Dementia in her paternal grandmother; Diabetes in her father; Hearing loss in her maternal grandmother; Hypertension in her mother; No Known Problems in her brother, paternal grandfather, and sister; Rheum arthritis in her maternal aunt  She  reports that she has never smoked  She has never used smokeless tobacco  She reports that she drinks alcohol  She reports that she does not use drugs    Current Outpatient Medications   Medication Sig Dispense Refill    norethindrone-ethinyl estradiol (MICROGESTIN 1/20) 1-20 MG-MCG per tablet Take 1 tablet by mouth daily (Patient taking differently: Take 1 tablet by mouth daily at bedtime ) 84 tablet 3     No current facility-administered medications for this visit  Current Outpatient Medications on File Prior to Visit   Medication Sig    norethindrone-ethinyl estradiol (MICROGESTIN 1/20) 1-20 MG-MCG per tablet Take 1 tablet by mouth daily (Patient taking differently: Take 1 tablet by mouth daily at bedtime )     No current facility-administered medications on file prior to visit  She has No Known Allergies       Review of Systems   Constitutional: Positive for appetite change (stress eating and overeating), fatigue and unexpected weight change (wt gain of about 30 lbs in one year and a half)  Negative for activity change, chills and fever  HENT: Negative for congestion, ear pain, postnasal drip, rhinorrhea, sinus pressure and sore throat  Eyes: Negative for pain  Respiratory: Negative for cough, choking, chest tightness, shortness of breath and wheezing  Cardiovascular: Negative for chest pain, palpitations (only with panic attacks) and leg swelling  Gastrointestinal: Negative for abdominal pain, constipation, diarrhea, nausea and vomiting  Genitourinary: Negative for dysuria and hematuria  Musculoskeletal: Negative for arthralgias, back pain, gait problem, joint swelling, myalgias and neck stiffness  Skin: Negative for pallor and rash  Neurological: Negative for dizziness, tremors, seizures, syncope, light-headedness and headaches (on and off)  Hematological: Negative for adenopathy  Psychiatric/Behavioral: Positive for dysphoric mood (for about one year with poor interest, with feelings of guilt and hopelessness and excessive tearing) and suicidal ideas  Negative for behavioral problems, decreased concentration and sleep disturbance  The patient is nervous/anxious (with occasional panic attacks especially at night when she wakes up  last one was two weeks ago  increased stress at this time with career)            Objective:      /74 (BP Location: Left arm, Patient Position: Sitting, Cuff Size: Large)   Pulse 93   Temp 98 2 °F (36 8 °C) (Temporal)   Ht 5' 5" (1 651 m)   Wt 113 kg (250 lb)   SpO2 98%   BMI 41 60 kg/m²          Physical Exam   Constitutional: She is oriented to person, place, and time  She appears well-developed and well-nourished  No distress  Morbidly obese   HENT:   Head: Normocephalic and atraumatic  Right Ear: External ear normal    Left Ear: External ear normal    Nose: Nose normal    Mouth/Throat: Oropharynx is clear and moist  No oropharyngeal exudate  Eyes: Pupils are equal, round, and reactive to light  Conjunctivae and EOM are normal  Right eye exhibits no discharge  Left eye exhibits no discharge  No scleral icterus  Neck: Normal range of motion  Neck supple  No JVD present  No tracheal deviation present  No thyromegaly present  Cardiovascular: Normal rate, regular rhythm, normal heart sounds and intact distal pulses  Exam reveals no gallop and no friction rub  No murmur heard  Pulmonary/Chest: Effort normal and breath sounds normal  No respiratory distress  She has no wheezes  She has no rales  She exhibits no tenderness  Abdominal: Soft  Bowel sounds are normal  She exhibits no distension and no mass  There is no tenderness  There is no rebound and no guarding  Musculoskeletal: Normal range of motion  She exhibits no edema, tenderness or deformity  Lymphadenopathy:     She has no cervical adenopathy  Neurological: She is alert and oriented to person, place, and time  She has normal reflexes  No cranial nerve deficit  She exhibits normal muscle tone  Coordination normal    Cranial nerves 2-12 are intact bilaterally  Muscle strength is 5/5 in all extremities  Sensation is intact in bilateral face and extremities  Deep tendon reflexes are 2+ bilaterally  Rapid alternating movement and finger-to-nose pointing test are intact  Gait is intact     Skin: Skin is warm and dry  No rash noted  She is not diaphoretic  No erythema  No pallor  Psychiatric: She has a normal mood and affect   Her behavior is normal          Admission on 11/19/2019, Discharged on 11/19/2019   Component Date Value Ref Range Status    EXT Preg Test, Ur 11/19/2019 Negative  Negative Final    Control 11/19/2019 Valid  Valid Final    Case Report 11/19/2019    Final                    Value:Surgical Pathology Report                         Case: [id number]                                   Authorizing Provider:  Bailee Pepper MD         Collected:           11/19/2019 0954              Ordering Location:     [facility]        Received:            11/19/2019 [facility]                                                      Pathologist:           Ivette Murillo MD                                                        Specimens:   A) - Polyp, Cervical/Endometrial                                                                    B) - Endometrium, Endometrial curettings                                                   Final Diagnosis 11/19/2019    Final                    Value: This result contains rich text formatting which cannot be displayed here   Additional Information 11/19/2019    Final                    Value: This result contains rich text formatting which cannot be displayed here  Dwayne Sal Gross Description 11/19/2019    Final                    Value: This result contains rich text formatting which cannot be displayed here     Orders Only on 11/14/2019   Component Date Value Ref Range Status    Thyroid Peroxidase Antibodies 11/14/2019 <1  <9 IU/mL Final    Free t4 11/14/2019 0 9  0 8 - 1 8 ng/dL Final    TSH 11/14/2019 4 34  mIU/L Final    Comment:           Reference Range                         > or = 20 Years  0 40-4 50                              Pregnancy Ranges            First trimester    0 26-2 66            Second trimester   0 55-2 73 Third trimester    0 43-2 91     Prep for Procedure on 11/12/2019   Component Date Value Ref Range Status    White Blood Cell Count 11/18/2019 5 2  3 8 - 10 8 Thousand/uL Final    Red Blood Cell Count 11/18/2019 4 28  3 80 - 5 10 Million/uL Final    Hemoglobin 11/18/2019 12 4  11 7 - 15 5 g/dL Final    HCT 11/18/2019 38 1  35 0 - 45 0 % Final    MCV 11/18/2019 89 0  80 0 - 100 0 fL Final    MCH 11/18/2019 29 0  27 0 - 33 0 pg Final    MCHC 11/18/2019 32 5  32 0 - 36 0 g/dL Final    RDW 11/18/2019 12 6  11 0 - 15 0 % Final    Platelet Count 27/26/0798 364  140 - 400 Thousand/uL Final    SL AMB MPV 11/18/2019 10 3  7 5 - 12 5 fL Final    Neutrophils (Absolute) 11/18/2019 3,026  1,500 - 7,800 cells/uL Final    Lymphocytes (Absolute) 11/18/2019 1,685  850 - 3,900 cells/uL Final    Monocytes (Absolute) 11/18/2019 369  200 - 950 cells/uL Final    Eosinophils (Absolute) 11/18/2019 99  15 - 500 cells/uL Final    Basophils ABS 11/18/2019 21  0 - 200 cells/uL Final    Neutrophils 11/18/2019 58 2  % Final    Lymphocytes 11/18/2019 32 4  % Final    Monocytes 11/18/2019 7 1  % Final    Eosinophils 11/18/2019 1 9  % Final    Basophils PCT 11/18/2019 0 4  % Final    Glucose, Random 11/18/2019 83  65 - 99 mg/dL Final    Comment:               Fasting reference interval         BUN 11/18/2019 11  7 - 25 mg/dL Final    Creatinine 11/18/2019 0 86  0 50 - 1 10 mg/dL Final    eGFR Non  11/18/2019 93  > OR = 60 mL/min/1 73m2 Final    eGFR  11/18/2019 107  > OR = 60 mL/min/1 73m2 Final    SL AMB BUN/CREATININE RATIO 99/07/0486 NOT APPLICABLE  6 - 22 (calc) Final    Sodium 11/18/2019 138  135 - 146 mmol/L Final    Potassium 11/18/2019 4 1  3 5 - 5 3 mmol/L Final    Chloride 11/18/2019 104  98 - 110 mmol/L Final    CO2 11/18/2019 27  20 - 32 mmol/L Final    Calcium 11/18/2019 9 2  8 6 - 10 2 mg/dL Final    Protein, Total 11/18/2019 7 6  6 1 - 8 1 g/dL Final    Albumin 11/18/2019 4 2  3 6 - 5 1 g/dL Final    Globulin 11/18/2019 3 4  1 9 - 3 7 g/dL (calc) Final    Albumin/Globulin Ratio 11/18/2019 1 2  1 0 - 2 5 (calc) Final    TOTAL BILIRUBIN 11/18/2019 0 4  0 2 - 1 2 mg/dL Final    Alkaline Phosphatase 11/18/2019 76  33 - 115 U/L Final    AST 11/18/2019 17  10 - 30 U/L Final    ALT 11/18/2019 21  6 - 29 U/L Final   Annual Exam on 09/03/2019   Component Date Value Ref Range Status    Pap, Liquid Based 09/03/2019 NILM   Final    Comment: DIAGNOSIS:            Negative for intraepithelial lesion or malignancy                        Fungal organisms morphologically consistent with                         Yamilet species  ADEQUACY:             Satisfactory for evaluation /                         Endocervical/transformation zone component                         present  COMMENT:              This Pap smear was screened with the assistance                         of the [employer]Prep(TM) Imaging System and                         screened by a cytotechnologist   SPECIMEN SOURCE:      Pap (Reflex to HPV DNA Genotyping 16, 18 when                         ASC-US or >), CERVICAL-ENDOCERVICAL  CLINICAL INFORMATION: LMP: 8/10/2019                        Provided Diagnosis Codes: Z01 419                                                Cervicovaginal cytology should be considered a                         screening procedure subject to false negatives                         and false positives  Results are more reliable                                                    when a satisfactory sample is obtained on a                         regular repetitive basis, and should be                         interpreted together with past and current                         clinical data    ELECTRONICALLY SIGNED   BY:                   Screened By: MERCEDES Bonilla (ASCP)   Case                         Electronically Signed 09/09/2019                                                Cytology screening and interpretation performed                         at:                        [address] , [address], [address]     Orders Only on 07/29/2019   Component Date Value Ref Range Status    Testosterone, Total, LC/MS 07/29/2019 56* 2 - 45 ng/dL Final    Comment: For additional information, please refer to  [email or web address]/faq/  [email or web address]  (This link is being provided for informational/  educational purposes only )     This test was developed and its analytical performance  characteristics have been determined by [address] [place] [address]  It has  not been cleared or approved by the [place]  Food and Drug  Administration  This assay has been validated pursuant  to the CLIA regulations and is used for clinical  purposes           White Blood Cell Count 07/29/2019 5 0  3 8 - 10 8 Thousand/uL Final    Red Blood Cell Count 07/29/2019 4 14  3 80 - 5 10 Million/uL Final    Hemoglobin 07/29/2019 12 1  11 7 - 15 5 g/dL Final    HCT 07/29/2019 37 5  35 0 - 45 0 % Final    MCV 07/29/2019 90 6  80 0 - 100 0 fL Final    MCH 07/29/2019 29 2  27 0 - 33 0 pg Final    MCHC 07/29/2019 32 3  32 0 - 36 0 g/dL Final    RDW 07/29/2019 12 9  11 0 - 15 0 % Final    Platelet Count 61/65/8630 337  140 - 400 Thousand/uL Final    SL AMB MPV 07/29/2019 10 2  7 5 - 12 5 fL Final    Neutrophils (Absolute) 07/29/2019 2,550  1,500 - 7,800 cells/uL Final    Lymphocytes (Absolute) 07/29/2019 1,920  850 - 3,900 cells/uL Final    Monocytes (Absolute) 07/29/2019 330  200 - 950 cells/uL Final    Eosinophils (Absolute) 07/29/2019 170  15 - 500 cells/uL Final    Basophils ABS 07/29/2019 30  0 - 200 cells/uL Final    Neutrophils 07/29/2019 51  % Final    Lymphocytes 07/29/2019 38 4  % Final    Monocytes 07/29/2019 6 6  % Final    Eosinophils 07/29/2019 3 4  % Final    Basophils PCT 07/29/2019 0 6  % Final    FSH 07/29/2019 5 3  mIU/mL Final    Comment:                     Reference Range                        Follicular Phase       3 8-39 4               Mid-cycle Peak         3 1-17 7               Luteal Phase           1 5- 9 1               Postmenopausal       23 0-116 3                                  Insulin 07/29/2019 10 3  2 0 - 19 6 uIU/mL  Final    Comment: This insulin assay shows strong cross-reactivity for  some insulin analogs (lispro, aspart, and glargine)  and much lower cross-reactivity with others (detemir,  glulisine)   Luteinizing Hormone (LH) 07/29/2019 12 7  mIU/mL Final    Comment:     Reference Range  Follicular Phase  2 0-14 5  Mid-Cycle Peak    8 7-76 3  Luteal Phase      0 5-16 9  Postmenopausal    10 0-54 7      Prolactin 07/29/2019 11 5  ng/mL Final    Comment:             Reference Range   Females          Non-pregnant        3 0-30 0          Pregnant           10 0-209 0          Postmenopausal      2 0-20 0                                          Office Visit on 07/26/2019   Component Date Value Ref Range Status    Chlamydia Trachomatis, Urine, rRNA 07/26/2019 NEGATIVE  NEGATIVE Final    Comment:  NOTE: Requests for Chlamydia (CT) and/or Gonorrhoeae (GC) were   processed using the Genprobe Aptima assay which employs an   amplified probe TMA assay   N  Gonorrhea, Urine, rRNA 07/26/2019 NEGATIVE  NEGATIVE Final    Comment:  NOTE: Requests for Chlamydia (CT) and/or Gonorrhoeae (GC) were   processed using the Genprobe Aptima assay which employs an   amplified probe TMA assay

## 2020-07-15 NOTE — PROGRESS NOTES
Assessment/Plan:      Reactive depression  -patient has been referred to psychotherapy   -I offered her an antidepressant but she does not like to take medications, especially daily medications  I have counseled her to read up on Wellbutrin which will likely not cause her to gain weight which is 1 of her problems about antidepressant   -patient was counseled to call 911 if she ever feels an overwhelming desire to commit suicide especially with a plan  Generalized anxiety disorder with occasional panic attacks  -I have referred patient to psychotherapy as requested  -patient was counseled that if she needs extra help, medications are also available    Morbid Obesity  -diet and exercise counseling given     Diagnoses and all orders for this visit:    Annual physical exam  -     CBC and differential; Future  -     Comprehensive metabolic panel; Future  -     TSH, 3rd generation with Free T4 reflex; Future  -     Lipid panel; Future    Class 3 severe obesity due to excess calories without serious comorbidity with body mass index (BMI) of 40 0 to 44 9 in [facility])    Reactive depression  -     Ambulatory referral to Psychology; Future  -     TSH, 3rd generation with Free T4 reflex; Future    Generalized anxiety disorder  -     Ambulatory referral to Psychology; Future  -     TSH, 3rd generation with Free T4 reflex; Future    Panic attacks  -     Ambulatory referral to Psychology; Future  -     TSH, 3rd generation with Free T4 reflex; Future          Subjective:      Patient ID: Gucci Feliciano is a 31 y o  female  HPI  Patient presents for an annual physical exam but also has complaints  Today, patient complains of feelings of depression that has been going on at a low level for many years in her life with acute worsening which she believes started since she was started on oral contraceptive pills for PCOS    She admits to feelings of depression, poor interest, stress eating, weight gain of about 30 lb in a year and half, feelings of anxiety, panic attacks, feelings of hopelessness and guilt, fatigue and suicidal ideation but she states that she does not have a plan to commit suicide and would not take her life because she cannot take pain  She admits to occasional headaches but denies fever, chills, night sweats, chest pain, shortness of breath, palpitations(except when she has panic attacks that usually wake her from sleep  She denies nightmares ), abdominal pain, diarrhea, constipation, myalgias, arthralgias  The following portions of the patient's history were reviewed and updated as appropriate:   She  has a past medical history of Disease of thyroid gland and Hyperlipidemia  She   Patient Active Problem List    Diagnosis Date Noted    Annual physical exam 07/15/2020    Class 3 severe obesity without serious comorbidity with body mass index (BMI) of 40 0 to 44 9 in [facility]) 07/15/2020    Reactive depression 07/15/2020    Generalized anxiety disorder 07/15/2020    Panic attacks 07/15/2020    Endometrial polyp 11/12/2019    Menorrhagia with irregular cycle 07/26/2019    Irregular menses 07/26/2019    Screening examination for STD (sexually transmitted disease) 07/26/2019    Dyspareunia in female 07/26/2019     She  has a past surgical history that includes [place] tooth extraction and pr hysteroscopy,w/endo bx (N/A, 11/19/2019)  Her family history includes Alcohol abuse in her maternal grandfather; Arthritis in her maternal grandmother; Breast cancer in her maternal grandmother; Cancer in her maternal grandmother; Dementia in her paternal grandmother; Diabetes in her father; Hearing loss in her maternal grandmother; Hypertension in her mother; No Known Problems in her brother, paternal grandfather, and sister; Rheum arthritis in her maternal aunt  She  reports that she has never smoked  She has never used smokeless tobacco  She reports that she drinks alcohol   She reports that she does not use drugs   Current Outpatient Medications   Medication Sig Dispense Refill    norethindrone-ethinyl estradiol (MICROGESTIN 1/20) 1-20 MG-MCG per tablet Take 1 tablet by mouth daily (Patient taking differently: Take 1 tablet by mouth daily at bedtime ) 84 tablet 3     No current facility-administered medications for this visit  Current Outpatient Medications on File Prior to Visit   Medication Sig    norethindrone-ethinyl estradiol (MICROGESTIN 1/20) 1-20 MG-MCG per tablet Take 1 tablet by mouth daily (Patient taking differently: Take 1 tablet by mouth daily at bedtime )     No current facility-administered medications on file prior to visit  She has No Known Allergies       Review of Systems   Constitutional: Positive for appetite change, fatigue and unexpected weight change  Negative for activity change, chills and fever  HENT: Negative for ear pain, postnasal drip, rhinorrhea, sinus pressure and sore throat  Eyes: Negative for pain  Respiratory: Negative for cough, choking, chest tightness, shortness of breath and wheezing  Cardiovascular: Negative for chest pain, palpitations and leg swelling  Gastrointestinal: Negative for abdominal pain, constipation, diarrhea, nausea and vomiting  Genitourinary: Negative for dysuria and hematuria  Musculoskeletal: Negative for arthralgias, back pain, gait problem, joint swelling, myalgias and neck stiffness  Skin: Negative for pallor and rash  Neurological: Negative for dizziness, tremors, seizures, syncope, light-headedness and headaches  Hematological: Negative for adenopathy  Psychiatric/Behavioral: Positive for dysphoric mood, sleep disturbance and suicidal ideas  Negative for behavioral problems  The patient is nervous/anxious            Objective:      /74 (BP Location: Left arm, Patient Position: Sitting, Cuff Size: Large)   Pulse 93   Temp 98 2 °F (36 8 °C) (Temporal)   Ht 5' 5" (1 651 m)   Wt 113 kg (250 lb)   SpO2 98%   BMI 41 60 kg/m²          Physical Exam   Constitutional: She is oriented to person, place, and time  She appears well-developed and well-nourished  No distress  Morbidly obese   HENT:   Head: Normocephalic and atraumatic  Right Ear: External ear normal    Left Ear: External ear normal    Nose: Nose normal    Mouth/Throat: Oropharynx is clear and moist  Mucous membranes are dry (Dry mucous membranes)  No oropharyngeal exudate  Eyes: Pupils are equal, round, and reactive to light  Conjunctivae and EOM are normal  Right eye exhibits no discharge  Left eye exhibits no discharge  No scleral icterus  Neck: Normal range of motion  Neck supple  No JVD present  No tracheal deviation present  No thyromegaly present  Cardiovascular: Normal rate, regular rhythm, normal heart sounds and intact distal pulses  Exam reveals no gallop and no friction rub  No murmur heard  Pulmonary/Chest: Effort normal and breath sounds normal  No respiratory distress  She has no wheezes  She has no rales  She exhibits no tenderness  Abdominal: Soft  Bowel sounds are normal  She exhibits no distension and no mass  There is no tenderness  There is no rebound and no guarding  Musculoskeletal: Normal range of motion  She exhibits no edema, tenderness or deformity  Lymphadenopathy:     She has no cervical adenopathy  Neurological: She is alert and oriented to person, place, and time  She has normal reflexes  No cranial nerve deficit  She exhibits normal muscle tone  Coordination normal    Skin: Skin is warm and dry  No rash noted  She is not diaphoretic  No erythema  No pallor  Psychiatric: Her behavior is normal  Her mood appears anxious  She exhibits a depressed mood           Admission on 11/19/2019, Discharged on 11/19/2019   Component Date Value Ref Range Status    EXT Preg Test, Ur 11/19/2019 Negative  Negative Final    Control 11/19/2019 Valid  Valid Final    Case Report 11/19/2019    Final                    Value:Surgical Pathology Report                         Case: [id number]                                   Authorizing Provider:  Brigida Lechuga MD         Collected:           11/19/2019 0954              Ordering Location:     [facility]        Received:            11/19/2019 [facility]                                                      Pathologist:           Sherryle Sessions, MD                                                        Specimens:   A) - Polyp, Cervical/Endometrial                                                                    B) - Endometrium, Endometrial curettings                                                   Final Diagnosis 11/19/2019    Final                    Value: This result contains rich text formatting which cannot be displayed here   Additional Information 11/19/2019    Final                    Value: This result contains rich text formatting which cannot be displayed here  Dan Gross Description 11/19/2019    Final                    Value: This result contains rich text formatting which cannot be displayed here     Orders Only on 11/14/2019   Component Date Value Ref Range Status    Thyroid Peroxidase Antibodies 11/14/2019 <1  <9 IU/mL Final    Free t4 11/14/2019 0 9  0 8 - 1 8 ng/dL Final    TSH 11/14/2019 4 34  mIU/L Final    Comment:           Reference Range                         > or = 20 Years  0 40-4 50                              Pregnancy Ranges            First trimester    0 26-2 66            Second trimester   0 55-2 73            Third trimester    0 43-2 91     Prep for Procedure on 11/12/2019   Component Date Value Ref Range Status    White Blood Cell Count 11/18/2019 5 2  3 8 - 10 8 Thousand/uL Final    Red Blood Cell Count 11/18/2019 4 28  3 80 - 5 10 Million/uL Final    Hemoglobin 11/18/2019 12 4  11 7 - 15 5 g/dL Final    HCT 11/18/2019 38 1  35 0 - 45 0 % Final    MCV 11/18/2019 89 0  80 0 - 100 0 fL Final    MCH 11/18/2019 29 0  27 0 - 33 0 pg Final    MCHC 11/18/2019 32 5  32 0 - 36 0 g/dL Final    RDW 11/18/2019 12 6  11 0 - 15 0 % Final    Platelet Count 14/60/9964 364  140 - 400 Thousand/uL Final    SL AMB MPV 11/18/2019 10 3  7 5 - 12 5 fL Final    Neutrophils (Absolute) 11/18/2019 3,026  1,500 - 7,800 cells/uL Final    Lymphocytes (Absolute) 11/18/2019 1,685  850 - 3,900 cells/uL Final    Monocytes (Absolute) 11/18/2019 369  200 - 950 cells/uL Final    Eosinophils (Absolute) 11/18/2019 99  15 - 500 cells/uL Final    Basophils ABS 11/18/2019 21  0 - 200 cells/uL Final    Neutrophils 11/18/2019 58 2  % Final    Lymphocytes 11/18/2019 32 4  % Final    Monocytes 11/18/2019 7 1  % Final    Eosinophils 11/18/2019 1 9  % Final    Basophils PCT 11/18/2019 0 4  % Final    Glucose, Random 11/18/2019 83  65 - 99 mg/dL Final    Comment:               Fasting reference interval         BUN 11/18/2019 11  7 - 25 mg/dL Final    Creatinine 11/18/2019 0 86  0 50 - 1 10 mg/dL Final    eGFR Non  11/18/2019 93  > OR = 60 mL/min/1 73m2 Final    eGFR  11/18/2019 107  > OR = 60 mL/min/1 73m2 Final    SL AMB BUN/CREATININE RATIO 08/80/6579 NOT APPLICABLE  6 - 22 (calc) Final    Sodium 11/18/2019 138  135 - 146 mmol/L Final    Potassium 11/18/2019 4 1  3 5 - 5 3 mmol/L Final    Chloride 11/18/2019 104  98 - 110 mmol/L Final    CO2 11/18/2019 27  20 - 32 mmol/L Final    Calcium 11/18/2019 9 2  8 6 - 10 2 mg/dL Final    Protein, Total 11/18/2019 7 6  6 1 - 8 1 g/dL Final    Albumin 11/18/2019 4 2  3 6 - 5 1 g/dL Final    Globulin 11/18/2019 3 4  1 9 - 3 7 g/dL (calc) Final    Albumin/Globulin Ratio 11/18/2019 1 2  1 0 - 2 5 (calc) Final    TOTAL BILIRUBIN 11/18/2019 0 4  0 2 - 1 2 mg/dL Final    Alkaline Phosphatase 11/18/2019 76  33 - 115 U/L Final    AST 11/18/2019 17  10 - 30 U/L Final    ALT 11/18/2019 21  6 - 29 U/L Final   Annual Exam on 09/03/2019   Component Date Value Ref Range Status    Pap, Liquid Based 09/03/2019 NILM   Final    Comment: DIAGNOSIS:            Negative for intraepithelial lesion or malignancy                        Fungal organisms morphologically consistent with                         Yamilet species  ADEQUACY:             Satisfactory for evaluation /                         Endocervical/transformation zone component                         present  COMMENT:              This Pap smear was screened with the assistance                         of the EndorphinPrep(TM) Imaging System and                         screened by a cytotechnologist   SPECIMEN SOURCE:      Pap (Reflex to HPV DNA Genotyping 16, 18 when                         ASC-US or >), CERVICAL-ENDOCERVICAL  CLINICAL INFORMATION: LMP: 8/10/2019                        Provided Diagnosis Codes: Z01 419                                                Cervicovaginal cytology should be considered a                         screening procedure subject to false negatives                         and false positives  Results are more reliable                                                    when a satisfactory sample is obtained on a                         regular repetitive basis, and should be                         interpreted together with past and current                         clinical data    ELECTRONICALLY SIGNED   BY:                   Screened By: MERCEDES Urbano (ASCP)   Case                         Electronically Signed 09/09/2019                                                Cytology screening and interpretation performed                         at:                        [address] , [address], [place]  [address]     Orders Only on 07/29/2019   Component Date Value Ref Range Status    Testosterone, Total, LC/MS 07/29/2019 56* 2 - 45 ng/dL Final    Comment: For additional information, please refer to  http://[employer]/faq/  [email or web address]  (This link is being provided for informational/  educational purposes only )     This test was developed and its analytical performance  characteristics have been determined by [address] [place] [address]  It has  not been cleared or approved by the [employer]  Food and Drug  Administration  This assay has been validated pursuant  to the CLIA regulations and is used for clinical  purposes           White Blood Cell Count 07/29/2019 5 0  3 8 - 10 8 Thousand/uL Final    Red Blood Cell Count 07/29/2019 4 14  3 80 - 5 10 Million/uL Final    Hemoglobin 07/29/2019 12 1  11 7 - 15 5 g/dL Final    HCT 07/29/2019 37 5  35 0 - 45 0 % Final    MCV 07/29/2019 90 6  80 0 - 100 0 fL Final    MCH 07/29/2019 29 2  27 0 - 33 0 pg Final    MCHC 07/29/2019 32 3  32 0 - 36 0 g/dL Final    RDW 07/29/2019 12 9  11 0 - 15 0 % Final    Platelet Count 77/86/3288 337  140 - 400 Thousand/uL Final    SL AMB MPV 07/29/2019 10 2  7 5 - 12 5 fL Final    Neutrophils (Absolute) 07/29/2019 2,550  1,500 - 7,800 cells/uL Final    Lymphocytes (Absolute) 07/29/2019 1,920  850 - 3,900 cells/uL Final    Monocytes (Absolute) 07/29/2019 330  200 - 950 cells/uL Final    Eosinophils (Absolute) 07/29/2019 170  15 - 500 cells/uL Final    Basophils ABS 07/29/2019 30  0 - 200 cells/uL Final    Neutrophils 07/29/2019 51  % Final    Lymphocytes 07/29/2019 38 4  % Final    Monocytes 07/29/2019 6 6  % Final    Eosinophils 07/29/2019 3 4  % Final    Basophils PCT 07/29/2019 0 6  % Final    FSH 07/29/2019 5 3  mIU/mL Final    Comment:                     Reference Range                        Follicular Phase       7 2-45 3               Mid-cycle Peak         3 1-17 7               Luteal Phase           1 5- 9 1 Postmenopausal       23 0-116 3                                  Insulin 07/29/2019 10 3  2 0 - 19 6 uIU/mL  Final    Comment: This insulin assay shows strong cross-reactivity for  some insulin analogs (lispro, aspart, and glargine)  and much lower cross-reactivity with others (detemir,  glulisine)   Luteinizing Hormone (LH) 07/29/2019 12 7  mIU/mL Final    Comment:     Reference Range  Follicular Phase  8 4-77 0  Mid-Cycle Peak    8 7-76 3  Luteal Phase      0 5-16 9  Postmenopausal    10 0-54 7      Prolactin 07/29/2019 11 5  ng/mL Final    Comment:             Reference Range   Females          Non-pregnant        3 0-30 0          Pregnant           10 0-209 0          Postmenopausal      2 0-20 0                                          Office Visit on 07/26/2019   Component Date Value Ref Range Status    Chlamydia Trachomatis, Urine, rRNA 07/26/2019 NEGATIVE  NEGATIVE Final    Comment:  NOTE: Requests for Chlamydia (CT) and/or Gonorrhoeae (GC) were   processed using the Genprobe Aptima assay which employs an   amplified probe TMA assay   N  Gonorrhea, Urine, rRNA 07/26/2019 NEGATIVE  NEGATIVE Final    Comment:  NOTE: Requests for Chlamydia (CT) and/or Gonorrhoeae (GC) were   processed using the Genprobe Aptima assay which employs an   amplified probe TMA assay

## 2020-07-15 NOTE — PATIENT INSTRUCTIONS

## 2020-07-21 LAB
ALBUMIN SERPL-MCNC: 4.1 G/DL (ref 3.6–5.1)
ALBUMIN/GLOB SERPL: 1.3 (CALC) (ref 1–2.5)
ALP SERPL-CCNC: 60 U/L (ref 31–125)
ALT SERPL-CCNC: 18 U/L (ref 6–29)
AST SERPL-CCNC: 16 U/L (ref 10–30)
BASOPHILS # BLD AUTO: 28 CELLS/UL (ref 0–200)
BASOPHILS NFR BLD AUTO: 0.5 %
BILIRUB SERPL-MCNC: 0.4 MG/DL (ref 0.2–1.2)
BUN SERPL-MCNC: 13 MG/DL (ref 7–25)
BUN/CREAT SERPL: NORMAL (CALC) (ref 6–22)
CALCIUM SERPL-MCNC: 9.5 MG/DL (ref 8.6–10.2)
CHLORIDE SERPL-SCNC: 101 MMOL/L (ref 98–110)
CHOLEST SERPL-MCNC: 236 MG/DL
CHOLEST/HDLC SERPL: 4.3 (CALC)
CO2 SERPL-SCNC: 25 MMOL/L (ref 20–32)
CREAT SERPL-MCNC: 0.88 MG/DL (ref 0.5–1.1)
EOSINOPHIL # BLD AUTO: 132 CELLS/UL (ref 15–500)
EOSINOPHIL NFR BLD AUTO: 2.4 %
ERYTHROCYTE [DISTWIDTH] IN BLOOD BY AUTOMATED COUNT: 12.8 % (ref 11–15)
GLOBULIN SER CALC-MCNC: 3.1 G/DL (CALC) (ref 1.9–3.7)
GLUCOSE SERPL-MCNC: 79 MG/DL (ref 65–99)
HCT VFR BLD AUTO: 38.8 % (ref 35–45)
HDLC SERPL-MCNC: 55 MG/DL
HGB BLD-MCNC: 12.6 G/DL (ref 11.7–15.5)
LDLC SERPL CALC-MCNC: 161 MG/DL (CALC)
LYMPHOCYTES # BLD AUTO: 2250 CELLS/UL (ref 850–3900)
LYMPHOCYTES NFR BLD AUTO: 40.9 %
MCH RBC QN AUTO: 29.2 PG (ref 27–33)
MCHC RBC AUTO-ENTMCNC: 32.5 G/DL (ref 32–36)
MCV RBC AUTO: 89.8 FL (ref 80–100)
MONOCYTES # BLD AUTO: 468 CELLS/UL (ref 200–950)
MONOCYTES NFR BLD AUTO: 8.5 %
NEUTROPHILS # BLD AUTO: 2624 CELLS/UL (ref 1500–7800)
NEUTROPHILS NFR BLD AUTO: 47.7 %
NONHDLC SERPL-MCNC: 181 MG/DL (CALC)
PLATELET # BLD AUTO: 385 THOUSAND/UL (ref 140–400)
PMV BLD REES-ECKER: 10.3 FL (ref 7.5–12.5)
POTASSIUM SERPL-SCNC: 4.2 MMOL/L (ref 3.5–5.3)
PROT SERPL-MCNC: 7.2 G/DL (ref 6.1–8.1)
RBC # BLD AUTO: 4.32 MILLION/UL (ref 3.8–5.1)
SL AMB EGFR AFRICAN AMERICAN: 104 ML/MIN/1.73M2
SL AMB EGFR NON AFRICAN AMERICAN: 89 ML/MIN/1.73M2
SODIUM SERPL-SCNC: 135 MMOL/L (ref 135–146)
TRIGL SERPL-MCNC: 93 MG/DL
TSH SERPL-ACNC: 3.06 MIU/L
WBC # BLD AUTO: 5.5 THOUSAND/UL (ref 3.8–10.8)

## 2020-07-29 ENCOUNTER — OFFICE VISIT (OUTPATIENT)
Dept: INTERNAL MEDICINE CLINIC | Age: 30
End: 2020-07-29
Payer: COMMERCIAL

## 2020-07-29 VITALS
SYSTOLIC BLOOD PRESSURE: 120 MMHG | TEMPERATURE: 97.7 F | OXYGEN SATURATION: 98 % | HEIGHT: 65 IN | DIASTOLIC BLOOD PRESSURE: 70 MMHG | WEIGHT: 213.6 LBS | BODY MASS INDEX: 35.59 KG/M2 | HEART RATE: 94 BPM

## 2020-07-29 VITALS
OXYGEN SATURATION: 98 % | HEIGHT: 65 IN | TEMPERATURE: 98.2 F | WEIGHT: 215 LBS | DIASTOLIC BLOOD PRESSURE: 74 MMHG | SYSTOLIC BLOOD PRESSURE: 118 MMHG | HEART RATE: 93 BPM | BODY MASS INDEX: 35.82 KG/M2

## 2020-07-29 DIAGNOSIS — E66.9 OBESITY (BMI 35.0-39.9 WITHOUT COMORBIDITY): ICD-10-CM

## 2020-07-29 DIAGNOSIS — F41.1 GENERALIZED ANXIETY DISORDER: ICD-10-CM

## 2020-07-29 DIAGNOSIS — E78.49 OTHER HYPERLIPIDEMIA: Primary | ICD-10-CM

## 2020-07-29 DIAGNOSIS — F41.0 PANIC ATTACKS: ICD-10-CM

## 2020-07-29 DIAGNOSIS — F32.9 REACTIVE DEPRESSION: ICD-10-CM

## 2020-07-29 PROCEDURE — 1036F TOBACCO NON-USER: CPT | Performed by: INTERNAL MEDICINE

## 2020-07-29 PROCEDURE — 99214 OFFICE O/P EST MOD 30 MIN: CPT | Performed by: INTERNAL MEDICINE

## 2020-07-29 PROCEDURE — 3008F BODY MASS INDEX DOCD: CPT | Performed by: INTERNAL MEDICINE

## 2020-07-29 RX ORDER — TRIAMCINOLONE ACETONIDE 1 MG/G
CREAM TOPICAL
COMMUNITY
Start: 2020-07-21 | End: 2020-11-19

## 2020-07-29 NOTE — PATIENT INSTRUCTIONS

## 2020-07-29 NOTE — PROGRESS NOTES
Assessment/Plan:    Hyperlipidemia  - lipid panel done on July 20th, 2020 showed a total cholesterol of 236, HDL of 55, triglyceride of 93 and LDL of 161  -patient was counseled to diet and exercise and also to use Omega 3 fatty acids  -will recheck a lipid panel prior to next visit  -follow-up in 3 months    Generalized anxiety disorder with occasional panic attacks  -patient has already been referred to psychotherapy at a previous visit and has been counseled to try make the appointment to see them   -she was counseled on the benefits SSRIs and other pharmacotherapy and encouraged to call the office if she changes her mind about starting a daily medication  Reactive depression  -patient was counseled to follow-up with psychotherapy  -she was also encouraged to call the office if she changes her mind about taking a daily SSRI or alternative medication for depression    Obesity  -BMI is 35 16  -patient was counseled to diet and exercise  -will refer patient to nutrition     Diagnoses and all orders for this visit:    Other hyperlipidemia  -     Lipid panel; Future    Generalized anxiety disorder    Reactive depression    Panic attacks    Obesity (BMI 35 0-39 9 without comorbidity)  -     Ambulatory referral to Nutrition Services; Future    Other orders  -     triamcinolone (KENALOG) 0 1 % cream; Once daily as needed          Subjective:      Patient ID: Yury Spence is a 31 y o  female  HPI  Presents for a follow-up visit regarding her generalized anxiety disorder, reactive depression, panic attacks, obesity, hyperlipidemia  She wants to review the results of the lab tests she had done recently  She states that she has not yet found a counselor for her depression, anxiety and panic attacks  She is still not ready to take medications at this time  She states that she hates to take medications every day because she hates having to remember to take them    She states that she occasionally forgets to take her oral contraceptives  She admits to occasional panic attacks and states that she usually wakes up in panic attacks  She snores but does not believe she has panic attacks while sleeping  She denies feeling sleepy during the day but admits to chronic fatigue  She states that she would like some help in losing weight  She used to stick to a restricted calorie but states that she does not do so at this time  She is aware of the phone apps that she can use to help her lose weight  She would like referral to nutrition  She denies fever, chills, night sweats, chest pain, palpitations, nausea, vomiting abdominal pain,constipation but admits occasional diarrhea without abdominal pain  The following portions of the patient's history were reviewed and updated as appropriate:   She  has a past medical history of Disease of thyroid gland and Hyperlipidemia  She   Patient Active Problem List    Diagnosis Date Noted    Other hyperlipidemia 07/29/2020    Annual physical exam 07/15/2020    Obesity (BMI 35 0-39 9 without comorbidity) 07/15/2020    Reactive depression 07/15/2020    Generalized anxiety disorder 07/15/2020    Panic attacks 07/15/2020    Endometrial polyp 11/12/2019    Menorrhagia with irregular cycle 07/26/2019    Irregular menses 07/26/2019    Screening examination for STD (sexually transmitted disease) 07/26/2019    Dyspareunia in female 07/26/2019     She  has a past surgical history that includes [place] tooth extraction and pr hysteroscopy,w/endo bx (N/A, 11/19/2019)  Her family history includes Alcohol abuse in her maternal grandfather; Arthritis in her maternal grandmother; Breast cancer in her maternal grandmother; Cancer in her maternal grandmother; Dementia in her paternal grandmother; Diabetes in her father; Hearing loss in her maternal grandmother; Hypertension in her mother; No Known Problems in her brother, paternal grandfather, and sister;  Rheum arthritis in her maternal aunt   She  reports that she has never smoked  She has never used smokeless tobacco  She reports that she drinks alcohol  She reports that she does not use drugs  Current Outpatient Medications   Medication Sig Dispense Refill    norethindrone-ethinyl estradiol (MICROGESTIN 1/20) 1-20 MG-MCG per tablet Take 1 tablet by mouth daily (Patient taking differently: Take 1 tablet by mouth daily at bedtime ) 84 tablet 3    triamcinolone (KENALOG) 0 1 % cream Once daily as needed       No current facility-administered medications for this visit  Current Outpatient Medications on File Prior to Visit   Medication Sig    norethindrone-ethinyl estradiol (MICROGESTIN 1/20) 1-20 MG-MCG per tablet Take 1 tablet by mouth daily (Patient taking differently: Take 1 tablet by mouth daily at bedtime )    triamcinolone (KENALOG) 0 1 % cream Once daily as needed     No current facility-administered medications on file prior to visit  She has No Known Allergies       Review of Systems   Constitutional: Positive for fatigue  Negative for activity change, chills, fever and unexpected weight change  HENT: Negative for ear pain, postnasal drip, rhinorrhea, sinus pressure and sore throat  Eyes: Negative for pain  Respiratory: Positive for shortness of breath (while sleeping)  Negative for cough, choking, chest tightness and wheezing  Cardiovascular: Negative for chest pain, palpitations and leg swelling  Gastrointestinal: Negative for abdominal pain, constipation, diarrhea, nausea and vomiting  Genitourinary: Negative for dysuria and hematuria  Musculoskeletal: Negative for arthralgias, back pain, gait problem, joint swelling, myalgias and neck stiffness  Skin: Negative for pallor and rash  Neurological: Negative for dizziness, tremors, seizures, syncope, light-headedness and headaches  Hematological: Negative for adenopathy  Psychiatric/Behavioral: Positive for dysphoric mood   Negative for behavioral problems  The patient is nervous/anxious  Objective:      /70 (BP Location: Left arm, Patient Position: Sitting, Cuff Size: Adult)   Pulse 94   Temp 97 7 °F (36 5 °C) (Temporal)   Ht 5' 5 35" (1 66 m)   Wt 96 9 kg (213 lb 9 6 oz)   SpO2 98%   BMI 35 16 kg/m²          Physical Exam   Constitutional: She is oriented to person, place, and time  She appears well-developed and well-nourished  No distress  obese   HENT:   Head: Normocephalic and atraumatic  Right Ear: External ear normal    Left Ear: External ear normal    Nose: Nose normal    Mouth/Throat: Oropharynx is clear and moist  Mucous membranes are dry  No oropharyngeal exudate  Eyes: Pupils are equal, round, and reactive to light  Conjunctivae and EOM are normal  Right eye exhibits no discharge  Left eye exhibits no discharge  No scleral icterus  Neck: Normal range of motion  Neck supple  No JVD present  No tracheal deviation present  No thyromegaly present  Cardiovascular: Normal rate, regular rhythm, normal heart sounds and intact distal pulses  Exam reveals no gallop and no friction rub  No murmur heard  Pulmonary/Chest: Effort normal and breath sounds normal  No respiratory distress  She has no wheezes  She has no rales  She exhibits no tenderness  Abdominal: Soft  Bowel sounds are normal  She exhibits no distension and no mass  There is no tenderness  There is no rebound and no guarding  Musculoskeletal: Normal range of motion  She exhibits no edema, tenderness or deformity  Lymphadenopathy:     She has no cervical adenopathy  Neurological: She is alert and oriented to person, place, and time  She has normal reflexes  No cranial nerve deficit  She exhibits normal muscle tone  Coordination normal    Skin: Skin is warm and dry  No rash noted  She is not diaphoretic  No erythema  No pallor  Psychiatric: Her behavior is normal  Her mood appears anxious  She exhibits a depressed mood           Orders Only on 07/20/2020 Component Date Value Ref Range Status    Total Cholesterol 07/20/2020 236* <200 mg/dL Final    HDL 07/20/2020 55  > OR = 50 mg/dL Final    Triglycerides 07/20/2020 93  <150 mg/dL Final    LDL Calculated 07/20/2020 161* mg/dL (calc) Final    Comment: Reference range: <100     Desirable range <100 mg/dL for primary prevention;    <70 mg/dL for patients with CHD or diabetic patients   with > or = 2 CHD risk factors  LDL-C is now calculated using the Oneal-Tadeo   calculation, which is a validated novel method providing   better accuracy than the Friedewald equation in the   estimation of LDL-C  Daniel Warner  [facility]  4383;092(73): [id number]   (http://[employer]/faq/YHU768)      Chol HDLC Ratio 07/20/2020 4 3  <5 0 (calc) Final    Non-HDL Cholesterol 07/20/2020 181* <130 mg/dL (calc) Final    Comment: For patients with diabetes plus 1 major ASCVD risk   factor, treating to a non-HDL-C goal of <100 mg/dL   (LDL-C of <70 mg/dL) is considered a therapeutic   option        Glucose, Random 07/20/2020 79  65 - 99 mg/dL Final    Comment:               Fasting reference interval         BUN 07/20/2020 13  7 - 25 mg/dL Final    Creatinine 07/20/2020 0 88  0 50 - 1 10 mg/dL Final    eGFR Non  07/20/2020 89  > OR = 60 mL/min/1 73m2 Final    eGFR  07/20/2020 104  > OR = 60 mL/min/1 73m2 Final    SL AMB BUN/CREATININE RATIO 86/82/0305 NOT APPLICABLE  6 - 22 (calc) Final    Sodium 07/20/2020 135  135 - 146 mmol/L Final    Potassium 07/20/2020 4 2  3 5 - 5 3 mmol/L Final    Chloride 07/20/2020 101  98 - 110 mmol/L Final    CO2 07/20/2020 25  20 - 32 mmol/L Final    Calcium 07/20/2020 9 5  8 6 - 10 2 mg/dL Final    Protein, Total 07/20/2020 7 2  6 1 - 8 1 g/dL Final    Albumin 07/20/2020 4 1  3 6 - 5 1 g/dL Final    Globulin 07/20/2020 3 1  1 9 - 3 7 g/dL (calc) Final    Albumin/Globulin Ratio 07/20/2020 1 3  1 0 - 2 5 (calc) Final    TOTAL BILIRUBIN 07/20/2020 0 4  0 2 - 1 2 mg/dL Final    Alkaline Phosphatase 07/20/2020 60  31 - 125 U/L Final    AST 07/20/2020 16  10 - 30 U/L Final    ALT 07/20/2020 18  6 - 29 U/L Final    White Blood Cell Count 07/20/2020 5 5  3 8 - 10 8 Thousand/uL Final    Red Blood Cell Count 07/20/2020 4 32  3 80 - 5 10 Million/uL Final    Hemoglobin 07/20/2020 12 6  11 7 - 15 5 g/dL Final    HCT 07/20/2020 38 8  35 0 - 45 0 % Final    MCV 07/20/2020 89 8  80 0 - 100 0 fL Final    MCH 07/20/2020 29 2  27 0 - 33 0 pg Final    MCHC 07/20/2020 32 5  32 0 - 36 0 g/dL Final    RDW 07/20/2020 12 8  11 0 - 15 0 % Final    Platelet Count 25/09/2201 385  140 - 400 Thousand/uL Final    SL AMB MPV 07/20/2020 10 3  7 5 - 12 5 fL Final    Neutrophils (Absolute) 07/20/2020 2,624  1,500 - 7,800 cells/uL Final    Lymphocytes (Absolute) 07/20/2020 2,250  850 - 3,900 cells/uL Final    Monocytes (Absolute) 07/20/2020 468  200 - 950 cells/uL Final    Eosinophils (Absolute) 07/20/2020 132  15 - 500 cells/uL Final    Basophils ABS 07/20/2020 28  0 - 200 cells/uL Final    Neutrophils 07/20/2020 47 7  % Final    Lymphocytes 07/20/2020 40 9  % Final    Monocytes 07/20/2020 8 5  % Final    Eosinophils 07/20/2020 2 4  % Final    Basophils PCT 07/20/2020 0 5  % Final    TSH W/RFX TO FREE T4 07/20/2020 3 06  mIU/L Final    Comment:           Reference Range                         > or = 20 Years  0 40-4 50                              Pregnancy Ranges            First trimester    0 26-2 66            Second trimester   0 55-2 73            Third trimester    0 43-2 91     Admission on 11/19/2019, Discharged on 11/19/2019   Component Date Value Ref Range Status    EXT Preg Test, Ur 11/19/2019 Negative  Negative Final    Control 11/19/2019 Valid  Valid Final    Case Report 11/19/2019    Final                    Value:Surgical Pathology Report                         Case: [id number]                                   Authorizing Provider: Ayleen Han MD         Collected:           11/19/2019 0954              Ordering Location:     [facility]        Received:            11/19/2019 [facility]                                                      Pathologist:           Aminata Villagomez MD                                                        Specimens:   A) - Polyp, Cervical/Endometrial                                                                    B) - Endometrium, Endometrial curettings                                                   Final Diagnosis 11/19/2019    Final                    Value: This result contains rich text formatting which cannot be displayed here   Additional Information 11/19/2019    Final                    Value: This result contains rich text formatting which cannot be displayed here  Rachelle Andra Gross Description 11/19/2019    Final                    Value: This result contains rich text formatting which cannot be displayed here     Orders Only on 11/14/2019   Component Date Value Ref Range Status    Thyroid Peroxidase Antibodies 11/14/2019 <1  <9 IU/mL Final    Free t4 11/14/2019 0 9  0 8 - 1 8 ng/dL Final    TSH 11/14/2019 4 34  mIU/L Final    Comment:           Reference Range                         > or = 20 Years  0 40-4 50                              Pregnancy Ranges            First trimester    0 26-2 66            Second trimester   0 55-2 73            Third trimester    0 43-2 91     Prep for Procedure on 11/12/2019   Component Date Value Ref Range Status    White Blood Cell Count 11/18/2019 5 2  3 8 - 10 8 Thousand/uL Final    Red Blood Cell Count 11/18/2019 4 28  3 80 - 5 10 Million/uL Final    Hemoglobin 11/18/2019 12 4  11 7 - 15 5 g/dL Final    HCT 11/18/2019 38 1  35 0 - 45 0 % Final    MCV 11/18/2019 89 0  80 0 - 100 0 fL Final    MCH 11/18/2019 29 0  27 0 - 33 0 pg Final    MCHC 11/18/2019 32 5  32 0 - 36 0 g/dL Final    RDW 11/18/2019 12 6  11 0 - 15 0 % Final    Platelet Count 76/29/6927 364  140 - 400 Thousand/uL Final    SL AMB MPV 11/18/2019 10 3  7 5 - 12 5 fL Final    Neutrophils (Absolute) 11/18/2019 3,026  1,500 - 7,800 cells/uL Final    Lymphocytes (Absolute) 11/18/2019 1,685  850 - 3,900 cells/uL Final    Monocytes (Absolute) 11/18/2019 369  200 - 950 cells/uL Final    Eosinophils (Absolute) 11/18/2019 99  15 - 500 cells/uL Final    Basophils ABS 11/18/2019 21  0 - 200 cells/uL Final    Neutrophils 11/18/2019 58 2  % Final    Lymphocytes 11/18/2019 32 4  % Final    Monocytes 11/18/2019 7 1  % Final    Eosinophils 11/18/2019 1 9  % Final    Basophils PCT 11/18/2019 0 4  % Final    Glucose, Random 11/18/2019 83  65 - 99 mg/dL Final    Comment:               Fasting reference interval         BUN 11/18/2019 11  7 - 25 mg/dL Final    Creatinine 11/18/2019 0 86  0 50 - 1 10 mg/dL Final    eGFR Non  11/18/2019 93  > OR = 60 mL/min/1 73m2 Final    eGFR  11/18/2019 107  > OR = 60 mL/min/1 73m2 Final    SL AMB BUN/CREATININE RATIO 88/22/1729 NOT APPLICABLE  6 - 22 (calc) Final    Sodium 11/18/2019 138  135 - 146 mmol/L Final    Potassium 11/18/2019 4 1  3 5 - 5 3 mmol/L Final    Chloride 11/18/2019 104  98 - 110 mmol/L Final    CO2 11/18/2019 27  20 - 32 mmol/L Final    Calcium 11/18/2019 9 2  8 6 - 10 2 mg/dL Final    Protein, Total 11/18/2019 7 6  6 1 - 8 1 g/dL Final    Albumin 11/18/2019 4 2  3 6 - 5 1 g/dL Final    Globulin 11/18/2019 3 4  1 9 - 3 7 g/dL (calc) Final    Albumin/Globulin Ratio 11/18/2019 1 2  1 0 - 2 5 (calc) Final    TOTAL BILIRUBIN 11/18/2019 0 4  0 2 - 1 2 mg/dL Final    Alkaline Phosphatase 11/18/2019 76  33 - 115 U/L Final    AST 11/18/2019 17  10 - 30 U/L Final    ALT 11/18/2019 21  6 - 29 U/L Final   Annual Exam on 09/03/2019   Component Date Value Ref Range Status    Pap, Liquid Based 09/03/2019 NILM   Final    Comment: DIAGNOSIS:            Negative for intraepithelial lesion or malignancy                        Fungal organisms morphologically consistent with                         Yamilet species  ADEQUACY:             Satisfactory for evaluation /                         Endocervical/transformation zone component                         present  COMMENT:              This Pap smear was screened with the assistance                         of the [employer](TM) Imaging System and                         screened by a cytotechnologist   SPECIMEN SOURCE:      Pap (Reflex to HPV DNA Genotyping 16, 18 when                         ASC-US or >), CERVICAL-ENDOCERVICAL  CLINICAL INFORMATION: LMP: 8/10/2019                        Provided Diagnosis Codes: Z01 419                                                Cervicovaginal cytology should be considered a                         screening procedure subject to false negatives                         and false positives  Results are more reliable                                                    when a satisfactory sample is obtained on a                         regular repetitive basis, and should be                         interpreted together with past and current                         clinical data    ELECTRONICALLY SIGNED   BY:                   Screened By: MERCEDES Bonilla (ASCP)   Case                         Electronically Signed 09/09/2019                                                Cytology screening and interpretation performed                         at:                        [address] , [address], [address]

## 2020-09-12 DIAGNOSIS — N92.6 IRREGULAR MENSES: ICD-10-CM

## 2020-09-16 RX ORDER — NORETHINDRONE ACETATE AND ETHINYL ESTRADIOL 1; .02 MG/1; MG/1
1 TABLET ORAL
Qty: 84 TABLET | Refills: 1 | Status: SHIPPED | OUTPATIENT
Start: 2020-09-16 | End: 2020-11-19 | Stop reason: SDUPTHER

## 2020-10-29 LAB
CHOLEST SERPL-MCNC: 222 MG/DL
CHOLEST/HDLC SERPL: 4.2 (CALC)
HDLC SERPL-MCNC: 53 MG/DL
LDLC SERPL CALC-MCNC: 147 MG/DL (CALC)
NONHDLC SERPL-MCNC: 169 MG/DL (CALC)
TRIGL SERPL-MCNC: 103 MG/DL

## 2020-11-05 ENCOUNTER — OFFICE VISIT (OUTPATIENT)
Dept: INTERNAL MEDICINE CLINIC | Age: 30
End: 2020-11-05
Payer: COMMERCIAL

## 2020-11-05 VITALS
SYSTOLIC BLOOD PRESSURE: 122 MMHG | DIASTOLIC BLOOD PRESSURE: 84 MMHG | TEMPERATURE: 97.4 F | HEART RATE: 88 BPM | OXYGEN SATURATION: 98 % | WEIGHT: 207.4 LBS | HEIGHT: 66 IN | BODY MASS INDEX: 33.33 KG/M2

## 2020-11-05 DIAGNOSIS — F32.9 REACTIVE DEPRESSION: ICD-10-CM

## 2020-11-05 DIAGNOSIS — E78.49 OTHER HYPERLIPIDEMIA: Primary | ICD-10-CM

## 2020-11-05 DIAGNOSIS — F41.1 GENERALIZED ANXIETY DISORDER: ICD-10-CM

## 2020-11-05 DIAGNOSIS — F41.0 PANIC ATTACKS: ICD-10-CM

## 2020-11-05 DIAGNOSIS — Z23 NEED FOR INFLUENZA VACCINATION: ICD-10-CM

## 2020-11-05 DIAGNOSIS — Z28.21 INFLUENZA VACCINATION DECLINED BY PATIENT: ICD-10-CM

## 2020-11-05 PROCEDURE — 99214 OFFICE O/P EST MOD 30 MIN: CPT | Performed by: INTERNAL MEDICINE

## 2020-11-05 PROCEDURE — 1036F TOBACCO NON-USER: CPT | Performed by: INTERNAL MEDICINE

## 2020-11-19 ENCOUNTER — ANNUAL EXAM (OUTPATIENT)
Dept: OBGYN CLINIC | Facility: CLINIC | Age: 30
End: 2020-11-19
Payer: COMMERCIAL

## 2020-11-19 VITALS
DIASTOLIC BLOOD PRESSURE: 80 MMHG | BODY MASS INDEX: 34.82 KG/M2 | SYSTOLIC BLOOD PRESSURE: 116 MMHG | WEIGHT: 209 LBS | HEIGHT: 65 IN | TEMPERATURE: 96.1 F

## 2020-11-19 DIAGNOSIS — N92.6 IRREGULAR MENSES: ICD-10-CM

## 2020-11-19 DIAGNOSIS — B37.9 YEAST INFECTION: ICD-10-CM

## 2020-11-19 DIAGNOSIS — Z01.419 GYNECOLOGIC EXAM NORMAL: Primary | ICD-10-CM

## 2020-11-19 DIAGNOSIS — E28.2 PCOS (POLYCYSTIC OVARIAN SYNDROME): ICD-10-CM

## 2020-11-19 DIAGNOSIS — R63.5 WEIGHT GAIN: ICD-10-CM

## 2020-11-19 DIAGNOSIS — R10.2 PELVIC PAIN: ICD-10-CM

## 2020-11-19 PROBLEM — B35.3 TINEA PEDIS: Status: ACTIVE | Noted: 2020-11-19

## 2020-11-19 PROBLEM — E66.9 OBESITY (BMI 35.0-39.9 WITHOUT COMORBIDITY): Status: RESOLVED | Noted: 2020-07-15 | Resolved: 2020-11-19

## 2020-11-19 PROBLEM — E03.8 SUBCLINICAL HYPOTHYROIDISM: Status: ACTIVE | Noted: 2018-05-25

## 2020-11-19 PROCEDURE — S0612 ANNUAL GYNECOLOGICAL EXAMINA: HCPCS | Performed by: PHYSICIAN ASSISTANT

## 2020-11-19 PROCEDURE — 3008F BODY MASS INDEX DOCD: CPT | Performed by: INTERNAL MEDICINE

## 2020-11-19 RX ORDER — FLUCONAZOLE 150 MG/1
150 TABLET ORAL EVERY OTHER DAY
Qty: 2 TABLET | Refills: 0 | Status: SHIPPED | OUTPATIENT
Start: 2020-11-19 | End: 2020-11-22

## 2020-11-19 RX ORDER — NORETHINDRONE ACETATE AND ETHINYL ESTRADIOL 1; .02 MG/1; MG/1
1 TABLET ORAL
Qty: 84 TABLET | Refills: 3 | Status: SHIPPED | OUTPATIENT
Start: 2020-11-19 | End: 2021-10-04

## 2020-12-23 ENCOUNTER — HOSPITAL ENCOUNTER (OUTPATIENT)
Dept: ULTRASOUND IMAGING | Facility: HOSPITAL | Age: 30
Discharge: HOME | End: 2020-12-23
Payer: COMMERCIAL

## 2020-12-23 DIAGNOSIS — E28.2 PCOS (POLYCYSTIC OVARIAN SYNDROME): ICD-10-CM

## 2020-12-23 DIAGNOSIS — R10.2 PELVIC PAIN: ICD-10-CM

## 2020-12-23 PROCEDURE — 76856 US EXAM PELVIC COMPLETE: CPT

## 2020-12-23 PROCEDURE — 76830 TRANSVAGINAL US NON-OB: CPT

## 2020-12-30 ENCOUNTER — TELEMEDICINE (OUTPATIENT)
Dept: INTERNAL MEDICINE CLINIC | Age: 30
End: 2020-12-30
Payer: COMMERCIAL

## 2020-12-30 VITALS — TEMPERATURE: 98.8 F | BODY MASS INDEX: 35.32 KG/M2 | HEIGHT: 65 IN | WEIGHT: 212 LBS

## 2020-12-30 DIAGNOSIS — R51.9 NONINTRACTABLE HEADACHE, UNSPECIFIED CHRONICITY PATTERN, UNSPECIFIED HEADACHE TYPE: Primary | ICD-10-CM

## 2020-12-30 DIAGNOSIS — J02.9 SORE THROAT: ICD-10-CM

## 2020-12-30 PROCEDURE — 99213 OFFICE O/P EST LOW 20 MIN: CPT | Performed by: INTERNAL MEDICINE

## 2020-12-30 PROCEDURE — 1036F TOBACCO NON-USER: CPT | Performed by: INTERNAL MEDICINE

## 2020-12-30 PROCEDURE — 3725F SCREEN DEPRESSION PERFORMED: CPT | Performed by: INTERNAL MEDICINE

## 2020-12-30 PROCEDURE — U0003 INFECTIOUS AGENT DETECTION BY NUCLEIC ACID (DNA OR RNA); SEVERE ACUTE RESPIRATORY SYNDROME CORONAVIRUS 2 (SARS-COV-2) (CORONAVIRUS DISEASE [COVID-19]), AMPLIFIED PROBE TECHNIQUE, MAKING USE OF HIGH THROUGHPUT TECHNOLOGIES AS DESCRIBED BY CMS-2020-01-R: HCPCS | Performed by: INTERNAL MEDICINE

## 2020-12-30 PROCEDURE — 3008F BODY MASS INDEX DOCD: CPT | Performed by: INTERNAL MEDICINE

## 2020-12-31 LAB — SARS-COV-2 RNA SPEC QL NAA+PROBE: NOT DETECTED

## 2021-04-09 ENCOUNTER — TELEPHONE (OUTPATIENT)
Dept: OBGYN CLINIC | Facility: CLINIC | Age: 31
End: 2021-04-09

## 2021-06-16 ENCOUNTER — APPOINTMENT (OUTPATIENT)
Dept: LAB | Age: 31
End: 2021-06-16
Payer: COMMERCIAL

## 2021-06-16 ENCOUNTER — OFFICE VISIT (OUTPATIENT)
Dept: INTERNAL MEDICINE CLINIC | Age: 31
End: 2021-06-16
Payer: COMMERCIAL

## 2021-06-16 VITALS
DIASTOLIC BLOOD PRESSURE: 76 MMHG | TEMPERATURE: 97.9 F | HEART RATE: 82 BPM | SYSTOLIC BLOOD PRESSURE: 124 MMHG | WEIGHT: 199.7 LBS | BODY MASS INDEX: 33.27 KG/M2 | OXYGEN SATURATION: 98 % | HEIGHT: 65 IN

## 2021-06-16 DIAGNOSIS — Z00.00 ANNUAL PHYSICAL EXAM: Primary | ICD-10-CM

## 2021-06-16 DIAGNOSIS — F32.9 REACTIVE DEPRESSION: ICD-10-CM

## 2021-06-16 DIAGNOSIS — E28.2 PCOS (POLYCYSTIC OVARIAN SYNDROME): ICD-10-CM

## 2021-06-16 DIAGNOSIS — F41.1 GENERALIZED ANXIETY DISORDER: ICD-10-CM

## 2021-06-16 DIAGNOSIS — K21.9 GASTROESOPHAGEAL REFLUX DISEASE WITHOUT ESOPHAGITIS: ICD-10-CM

## 2021-06-16 DIAGNOSIS — R63.5 WEIGHT GAIN: ICD-10-CM

## 2021-06-16 DIAGNOSIS — E78.49 OTHER HYPERLIPIDEMIA: ICD-10-CM

## 2021-06-16 DIAGNOSIS — Z00.00 ANNUAL PHYSICAL EXAM: ICD-10-CM

## 2021-06-16 LAB
ALBUMIN SERPL BCP-MCNC: 3.7 G/DL (ref 3.5–5)
ALP SERPL-CCNC: 81 U/L (ref 46–116)
ALT SERPL W P-5'-P-CCNC: 26 U/L (ref 12–78)
ANION GAP SERPL CALCULATED.3IONS-SCNC: 7 MMOL/L (ref 4–13)
AST SERPL W P-5'-P-CCNC: 13 U/L (ref 5–45)
BASOPHILS # BLD AUTO: 0.05 THOUSANDS/ΜL (ref 0–0.1)
BASOPHILS NFR BLD AUTO: 1 % (ref 0–1)
BILIRUB SERPL-MCNC: 0.32 MG/DL (ref 0.2–1)
BUN SERPL-MCNC: 14 MG/DL (ref 5–25)
CALCIUM SERPL-MCNC: 9.6 MG/DL (ref 8.3–10.1)
CHLORIDE SERPL-SCNC: 106 MMOL/L (ref 100–108)
CHOLEST SERPL-MCNC: 221 MG/DL (ref 50–200)
CO2 SERPL-SCNC: 27 MMOL/L (ref 21–32)
CREAT SERPL-MCNC: 0.93 MG/DL (ref 0.6–1.3)
EOSINOPHIL # BLD AUTO: 0.11 THOUSAND/ΜL (ref 0–0.61)
EOSINOPHIL NFR BLD AUTO: 2 % (ref 0–6)
ERYTHROCYTE [DISTWIDTH] IN BLOOD BY AUTOMATED COUNT: 13.2 % (ref 11.6–15.1)
GFR SERPL CREATININE-BSD FRML MDRD: 96 ML/MIN/1.73SQ M
GLUCOSE P FAST SERPL-MCNC: 91 MG/DL (ref 65–99)
HCT VFR BLD AUTO: 42.2 % (ref 34.8–46.1)
HDLC SERPL-MCNC: 54 MG/DL
HGB BLD-MCNC: 13.7 G/DL (ref 11.5–15.4)
IMM GRANULOCYTES # BLD AUTO: 0.01 THOUSAND/UL (ref 0–0.2)
IMM GRANULOCYTES NFR BLD AUTO: 0 % (ref 0–2)
INSULIN SERPL-ACNC: 10.7 MU/L (ref 3–25)
LDLC SERPL CALC-MCNC: 149 MG/DL (ref 0–100)
LYMPHOCYTES # BLD AUTO: 1.9 THOUSANDS/ΜL (ref 0.6–4.47)
LYMPHOCYTES NFR BLD AUTO: 35 % (ref 14–44)
MCH RBC QN AUTO: 29.7 PG (ref 26.8–34.3)
MCHC RBC AUTO-ENTMCNC: 32.5 G/DL (ref 31.4–37.4)
MCV RBC AUTO: 92 FL (ref 82–98)
MONOCYTES # BLD AUTO: 0.39 THOUSAND/ΜL (ref 0.17–1.22)
MONOCYTES NFR BLD AUTO: 7 % (ref 4–12)
NEUTROPHILS # BLD AUTO: 2.97 THOUSANDS/ΜL (ref 1.85–7.62)
NEUTS SEG NFR BLD AUTO: 55 % (ref 43–75)
NONHDLC SERPL-MCNC: 167 MG/DL
NRBC BLD AUTO-RTO: 0 /100 WBCS
PLATELET # BLD AUTO: 414 THOUSANDS/UL (ref 149–390)
PMV BLD AUTO: 10.1 FL (ref 8.9–12.7)
POTASSIUM SERPL-SCNC: 4.4 MMOL/L (ref 3.5–5.3)
PROT SERPL-MCNC: 8.3 G/DL (ref 6.4–8.2)
RBC # BLD AUTO: 4.61 MILLION/UL (ref 3.81–5.12)
SODIUM SERPL-SCNC: 140 MMOL/L (ref 136–145)
TRIGL SERPL-MCNC: 91 MG/DL
TSH SERPL DL<=0.05 MIU/L-ACNC: 3.59 UIU/ML (ref 0.36–3.74)
WBC # BLD AUTO: 5.43 THOUSAND/UL (ref 4.31–10.16)

## 2021-06-16 PROCEDURE — 99214 OFFICE O/P EST MOD 30 MIN: CPT | Performed by: INTERNAL MEDICINE

## 2021-06-16 PROCEDURE — 83525 ASSAY OF INSULIN: CPT

## 2021-06-16 PROCEDURE — 99395 PREV VISIT EST AGE 18-39: CPT | Performed by: INTERNAL MEDICINE

## 2021-06-16 PROCEDURE — 80053 COMPREHEN METABOLIC PANEL: CPT

## 2021-06-16 PROCEDURE — 80061 LIPID PANEL: CPT

## 2021-06-16 PROCEDURE — 36415 COLL VENOUS BLD VENIPUNCTURE: CPT

## 2021-06-16 PROCEDURE — 84443 ASSAY THYROID STIM HORMONE: CPT

## 2021-06-16 PROCEDURE — 85025 COMPLETE CBC W/AUTO DIFF WBC: CPT

## 2021-06-16 NOTE — PATIENT INSTRUCTIONS

## 2021-06-16 NOTE — PROGRESS NOTES
Assessment/Plan:    Annual physical examination  - History and physical examination done  - Pt was counseled to eat a heart healthy diet, to drink at least 2 L of water daily, to take a daily multivitamin and to exercise for at least 30 minutes of cardio exercise daily, for at least 5 days a week  - CBC, CMP, TSH and lipid panel have been ordered and we will follow up with the results  - patient is up-to-date the COVID vaccine but needs the tetanus shot   -she is up-to-date with her Pap smear   - follow up in 1 year for an annual physical exam or sooner as needed  Diagnoses and all orders for this visit:    Annual physical exam  -     CBC and differential; Future  -     TSH, 3rd generation with Free T4 reflex; Future  -     Comprehensive metabolic panel; Future  -     Lipid panel; Future    Reactive depression    Generalized anxiety disorder    Gastroesophageal reflux disease without esophagitis    Other hyperlipidemia          Subjective:      Patient ID: Kevin Cardona is a 36 y o  female      HPI  Patient presents for an annual physical exam     Last annual physical exam- 2020 but patient states that her health insurance is new    Past medical history- endometrial polyp, hyperlipidemia, seasonal allergies, PCOS, HLD    Past surgical history- D and C for endometrial polyps    Medications- OCP    Allergies- NKDA    Diet- balanced, drinks about 64 oz of water daily    Exercise- for about 3-4 hours a week    Alcohol use-socially with a  Max of 2-3 drinks    Caffeine and soda use- occasionally a caffeinated drink    Nicotine use- never    Recreational drug use- never    Work- a     Sexual history, STD history and HIV testing- monogamous with male partner, STD hx - never, HIV testing - done and negative    Gynecological history/Prostate health/testicular health history- LMP - 6/15/21, last pap smear - 2020,      Colonoscopy- N/A    Immunization history- last tetanus shot - 2010, covid vaccine - up to date    Dental visit- every six months  Vision- myopia - glasses     Family history- MGM - breast cancer, fibrocystic ds of the breat - mom, htn - htn, DM - dad, thyroid ds - MGM    Today, patient has no complaints  She does admit to occasional headaches, occasional nasal congestion with rhinorrhea, occasional constipation, nausea and dysphoric mood with anxiety  She also admits to occasional heartburn  She states that she takes NSAIDs for her pain because Tylenol does not work for her  She follows with psychotherapy and states that her symptoms are well controlled  She denies fever, chills, night sweats, sore throat, cough, shortness of breath, chest pain, palpitations, upper abdominal pain, diarrhea, arthralgias or myalgias  She also denies hematuria or hematochezia  The following portions of the patient's history were reviewed and updated as appropriate:   She  has a past medical history of Disease of thyroid gland and Hyperlipidemia  She   Patient Active Problem List    Diagnosis Date Noted    Gastroesophageal reflux disease without esophagitis 06/16/2021    PCOS (polycystic ovarian syndrome) 11/19/2020    Tinea pedis 11/19/2020    Gynecologic exam normal 11/19/2020    Need for influenza vaccination 11/05/2020    Influenza vaccination declined by patient 11/05/2020    Other hyperlipidemia 07/29/2020    Annual physical exam 07/15/2020    Reactive depression 07/15/2020    Generalized anxiety disorder 07/15/2020    Panic attacks 07/15/2020    Endometrial polyp 11/12/2019    Menorrhagia with irregular cycle 07/26/2019    Irregular menses 07/26/2019    Screening examination for STD (sexually transmitted disease) 07/26/2019    Dyspareunia in female 07/26/2019    Subclinical hypothyroidism 05/25/2018     She  has a past surgical history that includes [place] tooth extraction and pr hysteroscopy,w/endo bx (N/A, 11/19/2019)    Her family history includes Alcohol abuse in her maternal grandfather; Arthritis in her maternal grandmother; Breast cancer in her maternal grandmother; Breast cancer additional onset in her maternal grandmother; Cancer in her maternal grandmother; Dementia in her paternal grandmother; Diabetes in her father; Hearing loss in her maternal grandmother; Hypertension in her mother; No Known Problems in her brother, paternal grandfather, and sister; Rheum arthritis in her maternal aunt; Thyroid disease in her maternal grandmother  She  reports that she has never smoked  She has never used smokeless tobacco  She reports current alcohol use  She reports that she does not use drugs  Current Outpatient Medications   Medication Sig Dispense Refill    norethindrone-ethinyl estradiol (MICROGESTIN) 1-20 MG-MCG per tablet Take 1 tablet by mouth daily at bedtime 84 tablet 3     No current facility-administered medications for this visit  Current Outpatient Medications on File Prior to Visit   Medication Sig    norethindrone-ethinyl estradiol (MICROGESTIN) 1-20 MG-MCG per tablet Take 1 tablet by mouth daily at bedtime     No current facility-administered medications on file prior to visit  She has No Known Allergies       Review of Systems   Constitutional: Negative for activity change, chills, fatigue, fever and unexpected weight change  HENT: Positive for congestion (occasionally, last happened yesterday usually after long work hours- sometimes exposed to chemicals) and rhinorrhea  Negative for ear pain, postnasal drip, sinus pressure and sore throat  Eyes: Negative for pain  Respiratory: Negative for cough, choking, chest tightness, shortness of breath and wheezing  Cardiovascular: Negative for chest pain, palpitations and leg swelling  Gastrointestinal: Positive for constipation (occasionally but not right now) and nausea (occasionally)  Negative for abdominal pain, diarrhea and vomiting  Genitourinary: Negative for dysuria and hematuria  Musculoskeletal: Negative for arthralgias, back pain, gait problem, joint swelling, myalgias and neck stiffness  Skin: Negative for pallor and rash  Neurological: Positive for headaches (occasionally)  Negative for dizziness, tremors, seizures, syncope and light-headedness  Hematological: Negative for adenopathy  Psychiatric/Behavioral: Positive for dysphoric mood  Negative for behavioral problems  The patient is nervous/anxious (doing okay with counseling and does not want meds )  Objective:      /76 (BP Location: Left arm, Patient Position: Sitting, Cuff Size: Large)   Pulse 82   Temp 97 9 °F (36 6 °C) (Temporal)   Ht 5' 4 96" (1 65 m)   Wt 90 6 kg (199 lb 11 2 oz)   SpO2 98%   BMI 33 27 kg/m²          Physical Exam  Constitutional:       General: She is not in acute distress  Appearance: She is well-developed  She is not diaphoretic  HENT:      Head: Normocephalic and atraumatic  Right Ear: External ear normal       Left Ear: External ear normal       Nose: Mucosal edema and congestion present  Right Turbinates: Swollen  Left Turbinates: Swollen  Mouth/Throat:      Mouth: Mucous membranes are dry  Pharynx: Posterior oropharyngeal erythema (Oropharyngeal erythema dry mucous mbs) present  No oropharyngeal exudate  Eyes:      General: No scleral icterus  Right eye: No discharge  Left eye: No discharge  Conjunctiva/sclera: Conjunctivae normal       Pupils: Pupils are equal, round, and reactive to light  Neck:      Thyroid: No thyromegaly  Vascular: No JVD  Trachea: No tracheal deviation  Cardiovascular:      Rate and Rhythm: Normal rate and regular rhythm  Heart sounds: Normal heart sounds  No murmur heard  No friction rub  No gallop  Pulmonary:      Effort: Pulmonary effort is normal  No respiratory distress  Breath sounds: Normal breath sounds  No wheezing or rales     Chest:      Chest wall: No tenderness  Abdominal:      General: Bowel sounds are normal  There is no distension  Palpations: Abdomen is soft  There is no mass  Tenderness: There is abdominal tenderness ( tenderness in the lower abdomen secondary to abdominal cramps) in the right lower quadrant, suprapubic area and left lower quadrant  There is no guarding or rebound  Musculoskeletal:         General: No tenderness or deformity  Normal range of motion  Cervical back: Normal range of motion and neck supple  Lymphadenopathy:      Cervical: No cervical adenopathy  Skin:     General: Skin is warm and dry  Coloration: Skin is not pale  Findings: No erythema or rash  Neurological:      Mental Status: She is alert and oriented to person, place, and time  Cranial Nerves: No cranial nerve deficit  Motor: No abnormal muscle tone  Coordination: Coordination normal       Deep Tendon Reflexes: Reflexes are normal and symmetric  Comments: Cranial nerves 2-12 are intact bilaterally  Muscle strength is 5/5 in all extremities  Sensation is intact in bilateral face and extremities  Rapid alternating movement and finger-to-nose pointing test are intact bilaterally  Deep tendon reflexes are 2+ bilaterally  Gait is intact       Psychiatric:         Behavior: Behavior normal            Telemedicine on 12/30/2020   Component Date Value Ref Range Status    SARS-CoV-2  12/30/2020 Not Detected  Not Detected Final    Comment: This nucleic acid amplification test was developed and its performance  characteristics determined by [facility]  Nucleic acid  amplification tests include PCR and TMA  This test has not been FDA  cleared or approved  This test has been authorized by FDA under an  Emergency Use Authorization (EUA)   This test is only authorized for  the duration of time the declaration that circumstances exist  justifying the authorization of the emergency use of in vitro  diagnostic tests for detection of SARS-CoV-2 virus and/or diagnosis  of COVID-19 infection under section 564(b)(1) of the Act, 21 U  [place]   316OKH-1(K) (1), unless the authorization is terminated or revoked  sooner  When diagnostic testing is negative, the possibility of a false  negative result should be considered in the context of a patient's  recent exposures and the presence of clinical signs and symptoms  consistent with COVID-19  An individual without symptoms of COVID-19  and who is not shedding SARS-CoV-2 virus would                            expect to have a  negative (not detected) result in this assay  Orders Only on 10/29/2020   Component Date Value Ref Range Status    Total Cholesterol 10/29/2020 222* <200 mg/dL Final    HDL 10/29/2020 53  > OR = 50 mg/dL Final    Triglycerides 10/29/2020 103  <150 mg/dL Final    LDL Calculated 10/29/2020 147* mg/dL (calc) Final    Comment: Reference range: <100     Desirable range <100 mg/dL for primary prevention;    <70 mg/dL for patients with CHD or diabetic patients   with > or = 2 CHD risk factors  LDL-C is now calculated using the Oneal-Tadeo   calculation, which is a validated novel method providing   better accuracy than the Friedewald equation in the   estimation of LDL-C  Fredo Handy al  [place] [id number];104(86): 5940-5533   (http://[employer]/faq/MFX772)      Chol HDLC Ratio 10/29/2020 4 2  <5 0 (calc) Final    Non-HDL Cholesterol 10/29/2020 169* <130 mg/dL (calc) Final    Comment: For patients with diabetes plus 1 major ASCVD risk   factor, treating to a non-HDL-C goal of <100 mg/dL   (LDL-C of <70 mg/dL) is considered a therapeutic   option       Orders Only on 07/20/2020   Component Date Value Ref Range Status    Total Cholesterol 07/20/2020 236* <200 mg/dL Final    HDL 07/20/2020 55  > OR = 50 mg/dL Final    Triglycerides 07/20/2020 93  <150 mg/dL Final    LDL Calculated 07/20/2020 161* mg/dL (calc) Final    Comment: Reference range: <100     Desirable range <100 mg/dL for primary prevention;    <70 mg/dL for patients with CHD or diabetic patients   with > or = 2 CHD risk factors  LDL-C is now calculated using the Oneal-Tadeo   calculation, which is a validated novel method providing   better accuracy than the Friedewald equation in the   estimation of LDL-C  Jesse Armstrong  Bassem Serrano  [id number];966(07): 8253-2600   (http://[employer]/faq/ORB595)      Chol HDLC Ratio 07/20/2020 4 3  <5 0 (calc) Final    Non-HDL Cholesterol 07/20/2020 181* <130 mg/dL (calc) Final    Comment: For patients with diabetes plus 1 major ASCVD risk   factor, treating to a non-HDL-C goal of <100 mg/dL   (LDL-C of <70 mg/dL) is considered a therapeutic   option        Glucose, Random 07/20/2020 79  65 - 99 mg/dL Final    Comment:               Fasting reference interval         BUN 07/20/2020 13  7 - 25 mg/dL Final    Creatinine 07/20/2020 0 88  0 50 - 1 10 mg/dL Final    eGFR Non  07/20/2020 89  > OR = 60 mL/min/1 73m2 Final    eGFR  07/20/2020 104  > OR = 60 mL/min/1 73m2 Final    SL AMB BUN/CREATININE RATIO 93/43/2961 NOT APPLICABLE  6 - 22 (calc) Final    Sodium 07/20/2020 135  135 - 146 mmol/L Final    Potassium 07/20/2020 4 2  3 5 - 5 3 mmol/L Final    Chloride 07/20/2020 101  98 - 110 mmol/L Final    CO2 07/20/2020 25  20 - 32 mmol/L Final    Calcium 07/20/2020 9 5  8 6 - 10 2 mg/dL Final    Protein, Total 07/20/2020 7 2  6 1 - 8 1 g/dL Final    Albumin 07/20/2020 4 1  3 6 - 5 1 g/dL Final    Globulin 07/20/2020 3 1  1 9 - 3 7 g/dL (calc) Final    Albumin/Globulin Ratio 07/20/2020 1 3  1 0 - 2 5 (calc) Final    TOTAL BILIRUBIN 07/20/2020 0 4  0 2 - 1 2 mg/dL Final    Alkaline Phosphatase 07/20/2020 60  31 - 125 U/L Final    AST 07/20/2020 16  10 - 30 U/L Final    ALT 07/20/2020 18  6 - 29 U/L Final    White Blood Cell Count 07/20/2020 5 5  3 8 - 10 8 Thousand/uL Final    Red Blood Cell Count 07/20/2020 4 32  3 80 - 5 10 Million/uL Final    Hemoglobin 07/20/2020 12 6  11 7 - 15 5 g/dL Final    HCT 07/20/2020 38 8  35 0 - 45 0 % Final    MCV 07/20/2020 89 8  80 0 - 100 0 fL Final    MCH 07/20/2020 29 2  27 0 - 33 0 pg Final    MCHC 07/20/2020 32 5  32 0 - 36 0 g/dL Final    RDW 07/20/2020 12 8  11 0 - 15 0 % Final    Platelet Count 51/21/9582 385  140 - 400 Thousand/uL Final    SL AMB MPV 07/20/2020 10 3  7 5 - 12 5 fL Final    Neutrophils (Absolute) 07/20/2020 2,624  1,500 - 7,800 cells/uL Final    Lymphocytes (Absolute) 07/20/2020 2,250  850 - 3,900 cells/uL Final    Monocytes (Absolute) 07/20/2020 468  200 - 950 cells/uL Final    Eosinophils (Absolute) 07/20/2020 132  15 - 500 cells/uL Final    Basophils ABS 07/20/2020 28  0 - 200 cells/uL Final    Neutrophils 07/20/2020 47 7  % Final    Lymphocytes 07/20/2020 40 9  % Final    Monocytes 07/20/2020 8 5  % Final    Eosinophils 07/20/2020 2 4  % Final    Basophils PCT 07/20/2020 0 5  % Final    TSH W/RFX TO FREE T4 07/20/2020 3 06  mIU/L Final    Comment:           Reference Range                         > or = 20 Years  0 40-4 50                              Pregnancy Ranges            First trimester    0 26-2 66            Second trimester   0 55-2 73            Third trimester    0 43-2 91

## 2021-06-16 NOTE — PROGRESS NOTES
Assessment/Plan:    Reactive depression  -stable  -patient declines any medications at this time  -continue to follow with psychotherapy    Generalized anxiety disorder  -stable  -continue to follow psychotherapy    GERD  -likely worsened by occasional NSAIDs  -patient was counseled to avoid diets and behaviors that trigger symptoms  -she was counseled to take her NSAIDs always with meals since she states that Tylenol does not work for her  -she was counseled that she may use over-the-counter Tums or Pepcid as needed and she should call the office if her symptoms worsen    Hyperlipidemia  -stable  -patient states that she has changed her diet  -will order a repeat lipid panel         Diagnoses and all orders for this visit:    Annual physical exam  -     CBC and differential; Future  -     TSH, 3rd generation with Free T4 reflex; Future  -     Comprehensive metabolic panel; Future  -     Lipid panel; Future    Reactive depression    Generalized anxiety disorder    Gastroesophageal reflux disease without esophagitis    Other hyperlipidemia    BMI 33 0-33 9,adult          BMI Counseling: Body mass index is 33 27 kg/m²  The BMI is above normal  Nutrition recommendations include consuming healthier snacks, limiting drinks that contain sugar, reducing intake of saturated and trans fat and reducing intake of cholesterol  Exercise recommendations include moderate physical activity 150 minutes/week  No pharmacotherapy was ordered  Patient referred to PCP due to patient being overweight  Subjective:      Patient ID: Gucci Feliciano is a 36 y o  female  HPI    Patient presents for an annual physical examination as well as a follow-up visit regarding her GERD, hyperlipidemia, anxiety and depression  Today, patient has no complaints  She does admit to occasional headaches, occasional nasal congestion with rhinorrhea, occasional constipation, nausea and dysphoric mood with anxiety    She also admits to occasional heartburn  She states that she takes NSAIDs for her pain because Tylenol does not work for her  She follows with psychotherapy and states that her symptoms are well controlled  She denies fever, chills, night sweats, sore throat, cough, shortness of breath, chest pain, palpitations, upper abdominal pain, diarrhea, arthralgias or myalgias  She also denies hematuria or hematochezia  The following portions of the patient's history were reviewed and updated as appropriate:   She  has a past medical history of Disease of thyroid gland and Hyperlipidemia  She   Patient Active Problem List    Diagnosis Date Noted    Gastroesophageal reflux disease without esophagitis 06/16/2021    PCOS (polycystic ovarian syndrome) 11/19/2020    Tinea pedis 11/19/2020    Gynecologic exam normal 11/19/2020    Need for influenza vaccination 11/05/2020    Influenza vaccination declined by patient 11/05/2020    Other hyperlipidemia 07/29/2020    Annual physical exam 07/15/2020    BMI 33 0-33 9,adult 07/15/2020    Reactive depression 07/15/2020    Generalized anxiety disorder 07/15/2020    Panic attacks 07/15/2020    Endometrial polyp 11/12/2019    Menorrhagia with irregular cycle 07/26/2019    Irregular menses 07/26/2019    Screening examination for STD (sexually transmitted disease) 07/26/2019    Dyspareunia in female 07/26/2019    Subclinical hypothyroidism 05/25/2018     She  has a past surgical history that includes [place] tooth extraction and pr hysteroscopy,w/endo bx (N/A, 11/19/2019)    Her family history includes Alcohol abuse in her maternal grandfather; Arthritis in her maternal grandmother; Breast cancer in her maternal grandmother; Breast cancer additional onset in her maternal grandmother; Cancer in her maternal grandmother; Dementia in her paternal grandmother; Diabetes in her father; Hearing loss in her maternal grandmother; Hypertension in her mother; No Known Problems in her brother, paternal grandfather, and sister; Rheum arthritis in her maternal aunt; Thyroid disease in her maternal grandmother  She  reports that she has never smoked  She has never used smokeless tobacco  She reports current alcohol use  She reports that she does not use drugs  Current Outpatient Medications   Medication Sig Dispense Refill    norethindrone-ethinyl estradiol (MICROGESTIN) 1-20 MG-MCG per tablet Take 1 tablet by mouth daily at bedtime 84 tablet 3     No current facility-administered medications for this visit  Current Outpatient Medications on File Prior to Visit   Medication Sig    norethindrone-ethinyl estradiol (MICROGESTIN) 1-20 MG-MCG per tablet Take 1 tablet by mouth daily at bedtime     No current facility-administered medications on file prior to visit  She has No Known Allergies       Review of Systems   Constitutional: Negative for activity change, chills, fatigue, fever and unexpected weight change  HENT: Positive for congestion ( occasionally) and rhinorrhea ( occasionally)  Negative for ear pain, postnasal drip, sinus pressure and sore throat  Eyes: Negative for pain  Respiratory: Negative for cough, choking, chest tightness, shortness of breath and wheezing  Cardiovascular: Negative for chest pain, palpitations and leg swelling  Gastrointestinal: Positive for constipation (Occasionally) and nausea (With occasional heartburn)  Negative for abdominal pain, diarrhea and vomiting  Genitourinary: Negative for dysuria and hematuria  Musculoskeletal: Negative for arthralgias, back pain, gait problem, joint swelling, myalgias and neck stiffness  Skin: Negative for pallor and rash  Neurological: Positive for headaches ( occasional headaches)  Negative for dizziness, tremors, seizures, syncope and light-headedness  Hematological: Negative for adenopathy  Psychiatric/Behavioral: Positive for dysphoric mood  Negative for behavioral problems  The patient is nervous/anxious  Objective:      /76 (BP Location: Left arm, Patient Position: Sitting, Cuff Size: Large)   Pulse 82   Temp 97 9 °F (36 6 °C) (Temporal)   Ht 5' 4 96" (1 65 m)   Wt 90 6 kg (199 lb 11 2 oz)   SpO2 98%   BMI 33 27 kg/m²          Physical Exam  Constitutional:       General: She is not in acute distress  Appearance: She is well-developed  She is not diaphoretic  HENT:      Head: Normocephalic and atraumatic  Right Ear: External ear normal       Left Ear: External ear normal       Nose: Mucosal edema and congestion present  Right Turbinates: Swollen  Left Turbinates: Swollen  Mouth/Throat:      Pharynx: Posterior oropharyngeal erythema (Oropharyngeal erythema with dry mucous membranes) present  No oropharyngeal exudate  Eyes:      General: No scleral icterus  Right eye: No discharge  Left eye: No discharge  Conjunctiva/sclera: Conjunctivae normal       Pupils: Pupils are equal, round, and reactive to light  Neck:      Thyroid: No thyromegaly  Vascular: No JVD  Trachea: No tracheal deviation  Cardiovascular:      Rate and Rhythm: Normal rate and regular rhythm  Heart sounds: Normal heart sounds  No murmur heard  No friction rub  No gallop  Pulmonary:      Effort: Pulmonary effort is normal  No respiratory distress  Breath sounds: Normal breath sounds  No wheezing or rales  Chest:      Chest wall: No tenderness  Abdominal:      General: Bowel sounds are normal  There is no distension  Palpations: Abdomen is soft  There is no mass  Tenderness: There is abdominal tenderness ( lower abdominal tenderness-patient is on her menstrual period) in the right lower quadrant, suprapubic area and left lower quadrant  There is no guarding or rebound  Musculoskeletal:         General: No tenderness or deformity  Normal range of motion  Cervical back: Normal range of motion and neck supple     Lymphadenopathy:      Cervical: No cervical adenopathy  Skin:     General: Skin is warm and dry  Coloration: Skin is not pale  Findings: No erythema or rash  Neurological:      Mental Status: She is alert and oriented to person, place, and time  Cranial Nerves: No cranial nerve deficit  Motor: No abnormal muscle tone  Coordination: Coordination normal       Deep Tendon Reflexes: Reflexes are normal and symmetric  Psychiatric:         Behavior: Behavior normal            Telemedicine on 12/30/2020   Component Date Value Ref Range Status    SARS-CoV-2  12/30/2020 Not Detected  Not Detected Final    Comment: This nucleic acid amplification test was developed and its performance  characteristics determined by [facility]  Nucleic acid  amplification tests include PCR and TMA  This test has not been FDA  cleared or approved  This test has been authorized by FDA under an  Emergency Use Authorization (EUA)  This test is only authorized for  the duration of time the declaration that circumstances exist  justifying the authorization of the emergency use of in vitro  diagnostic tests for detection of SARS-CoV-2 virus and/or diagnosis  of COVID-19 infection under section 564(b)(1) of the Act, 21 U  S C   008FPD-8(K) (1), unless the authorization is terminated or revoked  sooner  When diagnostic testing is negative, the possibility of a false  negative result should be considered in the context of a patient's  recent exposures and the presence of clinical signs and symptoms  consistent with COVID-19  An individual without symptoms of COVID-19  and who is not shedding SARS-CoV-2 virus would                            expect to have a  negative (not detected) result in this assay     Orders Only on 10/29/2020   Component Date Value Ref Range Status    Total Cholesterol 10/29/2020 222* <200 mg/dL Final    HDL 10/29/2020 53  > OR = 50 mg/dL Final    Triglycerides 10/29/2020 103  <150 mg/dL Final    LDL Calculated 10/29/2020 147* mg/dL (calc) Final    Comment: Reference range: <100     Desirable range <100 mg/dL for primary prevention;    <70 mg/dL for patients with CHD or diabetic patients   with > or = 2 CHD risk factors  LDL-C is now calculated using the Oneal-Tadeo   calculation, which is a validated novel method providing   better accuracy than the Friedewald equation in the   estimation of LDL-C  Federico Duran al  [facility]  8594;477(23): 5957-7323   (http://VideoNot.es/faq/RRT223)      Chol HDLC Ratio 10/29/2020 4 2  <5 0 (calc) Final    Non-HDL Cholesterol 10/29/2020 169* <130 mg/dL (calc) Final    Comment: For patients with diabetes plus 1 major ASCVD risk   factor, treating to a non-HDL-C goal of <100 mg/dL   (LDL-C of <70 mg/dL) is considered a therapeutic   option  Orders Only on 07/20/2020   Component Date Value Ref Range Status    Total Cholesterol 07/20/2020 236* <200 mg/dL Final    HDL 07/20/2020 55  > OR = 50 mg/dL Final    Triglycerides 07/20/2020 93  <150 mg/dL Final    LDL Calculated 07/20/2020 161* mg/dL (calc) Final    Comment: Reference range: <100     Desirable range <100 mg/dL for primary prevention;    <70 mg/dL for patients with CHD or diabetic patients   with > or = 2 CHD risk factors  LDL-C is now calculated using the Oneal-Tadeo   calculation, which is a validated novel method providing   better accuracy than the Friedewald equation in the   estimation of LDL-C  Federico Jarvis ebonie dorothea Flores[facility]  5324;253(67): 4381-8497   (http://VideoNot.es/faq/MRS009)      Chol HDLC Ratio 07/20/2020 4 3  <5 0 (calc) Final    Non-HDL Cholesterol 07/20/2020 181* <130 mg/dL (calc) Final    Comment: For patients with diabetes plus 1 major ASCVD risk   factor, treating to a non-HDL-C goal of <100 mg/dL   (LDL-C of <70 mg/dL) is considered a therapeutic   option        Glucose, Random 07/20/2020 79  65 - 99 mg/dL Final    Comment:               Fasting reference interval         BUN 07/20/2020 13  7 - 25 mg/dL Final    Creatinine 07/20/2020 0 88  0 50 - 1 10 mg/dL Final    eGFR Non  07/20/2020 89  > OR = 60 mL/min/1 73m2 Final    eGFR  07/20/2020 104  > OR = 60 mL/min/1 73m2 Final    SL AMB BUN/CREATININE RATIO 70/60/5961 NOT APPLICABLE  6 - 22 (calc) Final    Sodium 07/20/2020 135  135 - 146 mmol/L Final    Potassium 07/20/2020 4 2  3 5 - 5 3 mmol/L Final    Chloride 07/20/2020 101  98 - 110 mmol/L Final    CO2 07/20/2020 25  20 - 32 mmol/L Final    Calcium 07/20/2020 9 5  8 6 - 10 2 mg/dL Final    Protein, Total 07/20/2020 7 2  6 1 - 8 1 g/dL Final    Albumin 07/20/2020 4 1  3 6 - 5 1 g/dL Final    Globulin 07/20/2020 3 1  1 9 - 3 7 g/dL (calc) Final    Albumin/Globulin Ratio 07/20/2020 1 3  1 0 - 2 5 (calc) Final    TOTAL BILIRUBIN 07/20/2020 0 4  0 2 - 1 2 mg/dL Final    Alkaline Phosphatase 07/20/2020 60  31 - 125 U/L Final    AST 07/20/2020 16  10 - 30 U/L Final    ALT 07/20/2020 18  6 - 29 U/L Final    White Blood Cell Count 07/20/2020 5 5  3 8 - 10 8 Thousand/uL Final    Red Blood Cell Count 07/20/2020 4 32  3 80 - 5 10 Million/uL Final    Hemoglobin 07/20/2020 12 6  11 7 - 15 5 g/dL Final    HCT 07/20/2020 38 8  35 0 - 45 0 % Final    MCV 07/20/2020 89 8  80 0 - 100 0 fL Final    MCH 07/20/2020 29 2  27 0 - 33 0 pg Final    MCHC 07/20/2020 32 5  32 0 - 36 0 g/dL Final    RDW 07/20/2020 12 8  11 0 - 15 0 % Final    Platelet Count 47/33/5635 385  140 - 400 Thousand/uL Final    SL AMB MPV 07/20/2020 10 3  7 5 - 12 5 fL Final    Neutrophils (Absolute) 07/20/2020 2,624  1,500 - 7,800 cells/uL Final    Lymphocytes (Absolute) 07/20/2020 2,250  850 - 3,900 cells/uL Final    Monocytes (Absolute) 07/20/2020 468  200 - 950 cells/uL Final    Eosinophils (Absolute) 07/20/2020 132  15 - 500 cells/uL Final    Basophils ABS 07/20/2020 28  0 - 200 cells/uL Final    Neutrophils 07/20/2020 47 7  % Final    Lymphocytes 07/20/2020 40 9  % Final    Monocytes 07/20/2020 8 5  % Final    Eosinophils 07/20/2020 2 4  % Final    Basophils PCT 07/20/2020 0 5  % Final    TSH W/RFX TO FREE T4 07/20/2020 3 06  mIU/L Final    Comment:           Reference Range                         > or = 20 Years  0 40-4 50                              Pregnancy Ranges            First trimester    0 26-2 66            Second trimester   0 55-2 73            Third trimester    0 43-2 91

## 2021-12-15 ENCOUNTER — ANNUAL EXAM (OUTPATIENT)
Dept: OBGYN CLINIC | Facility: CLINIC | Age: 31
End: 2021-12-15
Payer: COMMERCIAL

## 2021-12-15 VITALS — WEIGHT: 203 LBS | BODY MASS INDEX: 33.82 KG/M2 | SYSTOLIC BLOOD PRESSURE: 118 MMHG | DIASTOLIC BLOOD PRESSURE: 82 MMHG

## 2021-12-15 DIAGNOSIS — E28.2 PCOS (POLYCYSTIC OVARIAN SYNDROME): Primary | ICD-10-CM

## 2021-12-15 PROCEDURE — 1036F TOBACCO NON-USER: CPT | Performed by: NURSE PRACTITIONER

## 2021-12-15 PROCEDURE — 99213 OFFICE O/P EST LOW 20 MIN: CPT | Performed by: NURSE PRACTITIONER

## 2022-04-06 ENCOUNTER — ANNUAL EXAM (OUTPATIENT)
Dept: OBGYN CLINIC | Facility: CLINIC | Age: 32
End: 2022-04-06
Payer: COMMERCIAL

## 2022-04-06 VITALS — SYSTOLIC BLOOD PRESSURE: 120 MMHG | WEIGHT: 213 LBS | BODY MASS INDEX: 35.49 KG/M2 | DIASTOLIC BLOOD PRESSURE: 80 MMHG

## 2022-04-06 DIAGNOSIS — E28.2 PCOS (POLYCYSTIC OVARIAN SYNDROME): ICD-10-CM

## 2022-04-06 DIAGNOSIS — Z01.419 ENCNTR FOR GYN EXAM (GENERAL) (ROUTINE) W/O ABN FINDINGS: Primary | ICD-10-CM

## 2022-04-06 PROCEDURE — 99395 PREV VISIT EST AGE 18-39: CPT | Performed by: NURSE PRACTITIONER

## 2022-04-06 PROCEDURE — G0476 HPV COMBO ASSAY CA SCREEN: HCPCS | Performed by: NURSE PRACTITIONER

## 2022-04-06 PROCEDURE — G0145 SCR C/V CYTO,THINLAYER,RESCR: HCPCS | Performed by: NURSE PRACTITIONER

## 2022-04-06 PROCEDURE — 1036F TOBACCO NON-USER: CPT | Performed by: NURSE PRACTITIONER

## 2022-04-06 RX ORDER — MEDROXYPROGESTERONE ACETATE 10 MG/1
10 TABLET ORAL DAILY
Qty: 10 TABLET | Refills: 0 | Status: SHIPPED | OUTPATIENT
Start: 2022-04-06 | End: 2022-07-07 | Stop reason: ALTCHOICE

## 2022-04-06 NOTE — PROGRESS NOTES
Assessment/Plan   Diagnoses and all orders for this visit:    Encntr for gyn exam (general) (routine) w/o abn findings  -     Liquid-based pap, screening    PCOS (polycystic ovarian syndrome)  -     medroxyPROGESTERone (PROVERA) 10 mg tablet; Take 1 tablet (10 mg total) by mouth daily        Discussion    Reviewed with patient normal exam today  Pap with HPV done today  Discussed options due to infrequent menses  Can restart alis-insotol, can try metformin, restart OCP or do cyclic provera  Pt would like to do cyclic provera  Feels so much better since stopping OCP and Alis-insotol  Advised pregnancy test at home than can start Provera 10mg daily x 10 days  If no menses in 3 months call office for Provera  No need for provera if own menses occurs  Reviewed importance of menses at least every 3 months for endometrial health  Normal breast exam today  Monthly SBEs advised  Vitamin D and Calcim Supplements advised  Exercise most days of the week  Follow with PCP for regular check-ups and blood work  RTO 1 year for annual or sooner as needed  Subjective     Smiley De La Paz is a 29 y o  female who presents for annual well woman exam    Last exam 11/19/2020 Pap 9/3/2019 Normal   Pap guidelines reviewed with patient  Pt would like Pap today  Pt denies any abnormal vaginal discharge, itching, or odor  Pt in a mutually exclusive relationship () with a male partner and denies the need for STD testing today  Menstrual Cycle:  LMP: 1/2022   Menses regualr, has not had one since stopping OCP  OB History     G 0   Contraception: Condoms stopped pill 12/2021  Practices monthly SBEs, no breast complaints today  Denies any bowel or bladder issues  Pt follows with PCP for regular check-ups and blood work  Review of Systems   Genitourinary: Negative        The following portions of the patient's history were reviewed and updated as appropriate: allergies, current medications, past family history, past medical history, past social history, past surgical history and problem list     Past Medical History:   Diagnosis Date    Disease of thyroid gland     Hyperlipidemia     Hypothyroidism     Polycystic ovary syndrome        Past Surgical History:   Procedure Laterality Date    ID HYSTEROSCOPY,W/ENDO BX N/A 11/19/2019    Procedure: DILATATION AND CURETTAGE (D&C) WITH HYSTEROSCOPY;  Surgeon: Austin Andrade MD;  [facility];  Service: Gynecology    WISDOM TOOTH EXTRACTION         Family History   Problem Relation Age of Onset    Hypertension Mother     Diabetes Father     Breast cancer Maternal Grandmother     Arthritis Maternal Grandmother     Cancer Maternal Grandmother         breast cancer    Hearing loss Maternal Grandmother     Thyroid disease Maternal Grandmother     Breast cancer additional onset Maternal Grandmother     No Known Problems Sister     No Known Problems Brother     Alcohol abuse Maternal Grandfather     Dementia Paternal Grandmother     No Known Problems Paternal Grandfather     Rheum arthritis Maternal Aunt        Social History     Socioeconomic History    Marital status: Single     Spouse name: Not on file    Number of children: Not on file    Years of education: Not on file    Highest education level: Not on file   Occupational History    Not on file   Tobacco Use    Smoking status: Never Smoker    Smokeless tobacco: Never Used   Vaping Use    Vaping Use: Never used   Substance and Sexual Activity    Alcohol use:  Yes     Alcohol/week: 0 0 standard drinks     Comment: socially    Drug use: Never    Sexual activity: Yes     Partners: Male     Birth control/protection: OCP   Other Topics Concern    Not on file   Social History Narrative    Not on file     Social Determinants of Health     Financial Resource Strain: Not on file   Food Insecurity: Not on file   Transportation Needs: Not on file   Physical Activity: Not on file   Stress: Not on file Social Connections: Not on file   Intimate Partner Violence: Not on file   Housing Stability: Not on file         Current Outpatient Medications:     medroxyPROGESTERone (PROVERA) 10 mg tablet, Take 1 tablet (10 mg total) by mouth daily, Disp: 10 tablet, Rfl: 0    No Known Allergies    Objective   Vitals:    04/06/22 1608   BP: 120/80   Weight: 96 6 kg (213 lb)     Physical Exam  Vitals and nursing note reviewed  Constitutional:       Appearance: She is well-developed  HENT:      Head: Normocephalic  Neck:      Thyroid: No thyromegaly  Trachea: No tracheal deviation  Cardiovascular:      Rate and Rhythm: Normal rate and regular rhythm  Heart sounds: Normal heart sounds  Pulmonary:      Effort: Pulmonary effort is normal       Breath sounds: Normal breath sounds  Chest:   Breasts: Breasts are symmetrical       Right: No inverted nipple, mass, nipple discharge, skin change or tenderness  Left: No inverted nipple, mass, nipple discharge, skin change or tenderness  Abdominal:      General: Bowel sounds are normal  There is no distension  Palpations: Abdomen is soft  There is no mass  Tenderness: There is no abdominal tenderness  There is no guarding or rebound  Genitourinary:     Labia:         Right: No rash, tenderness, lesion or injury  Left: No rash, tenderness, lesion or injury  Vagina: Normal       Cervix: Normal       Uterus: Normal        Adnexa: Right adnexa normal and left adnexa normal         Right: No mass, tenderness or fullness  Left: No mass, tenderness or fullness  Musculoskeletal:         General: Normal range of motion  Cervical back: Normal range of motion and neck supple  Skin:     General: Skin is warm and dry  Neurological:      Mental Status: She is alert and oriented to person, place, and time  Psychiatric:         Behavior: Behavior normal          Thought Content:  Thought content normal          Judgment: Judgment normal

## 2022-04-08 LAB
HPV HR 12 DNA CVX QL NAA+PROBE: NEGATIVE
HPV16 DNA CVX QL NAA+PROBE: NEGATIVE
HPV18 DNA CVX QL NAA+PROBE: NEGATIVE

## 2022-04-14 LAB
LAB AP GYN PRIMARY INTERPRETATION: NORMAL
LAB AP LMP: NORMAL
Lab: NORMAL

## 2022-07-07 ENCOUNTER — OFFICE VISIT (OUTPATIENT)
Dept: INTERNAL MEDICINE CLINIC | Age: 32
End: 2022-07-07
Payer: COMMERCIAL

## 2022-07-07 VITALS
WEIGHT: 211 LBS | TEMPERATURE: 98.1 F | OXYGEN SATURATION: 98 % | SYSTOLIC BLOOD PRESSURE: 106 MMHG | HEART RATE: 81 BPM | BODY MASS INDEX: 35.16 KG/M2 | DIASTOLIC BLOOD PRESSURE: 58 MMHG | HEIGHT: 65 IN

## 2022-07-07 DIAGNOSIS — Z00.00 ANNUAL PHYSICAL EXAM: Primary | ICD-10-CM

## 2022-07-07 DIAGNOSIS — B35.3 TINEA PEDIS OF RIGHT FOOT: ICD-10-CM

## 2022-07-07 DIAGNOSIS — E03.8 SUBCLINICAL HYPOTHYROIDISM: ICD-10-CM

## 2022-07-07 DIAGNOSIS — E78.49 OTHER HYPERLIPIDEMIA: ICD-10-CM

## 2022-07-07 DIAGNOSIS — F32.9 REACTIVE DEPRESSION: ICD-10-CM

## 2022-07-07 PROCEDURE — 99214 OFFICE O/P EST MOD 30 MIN: CPT | Performed by: INTERNAL MEDICINE

## 2022-07-07 PROCEDURE — 3725F SCREEN DEPRESSION PERFORMED: CPT | Performed by: INTERNAL MEDICINE

## 2022-07-07 PROCEDURE — 99395 PREV VISIT EST AGE 18-39: CPT | Performed by: INTERNAL MEDICINE

## 2022-07-07 RX ORDER — BUPROPION HYDROCHLORIDE 150 MG/1
150 TABLET ORAL EVERY MORNING
Qty: 30 TABLET | Refills: 5 | Status: SHIPPED | OUTPATIENT
Start: 2022-07-07

## 2022-07-07 RX ORDER — TERBINAFINE HYDROCHLORIDE 250 MG/1
250 TABLET ORAL DAILY
Qty: 14 TABLET | Refills: 0 | Status: SHIPPED | OUTPATIENT
Start: 2022-07-07 | End: 2022-07-21

## 2022-07-07 NOTE — PROGRESS NOTES
Assessment/Plan:    Annual physical examination   - History and physical examination done  - Pt was counseled to eat a heart healthy diet, to drink at least 2 L of water daily, to take a daily multivitamin and to exercise for at least 30 minutes of cardio exercise daily, for at least 5 days a week  - CBC, CMP, TSH and lipid panel have been ordered and we will follow up with the results  - she is up-to-date with her tetanus vaccine and COVID vaccine  - she is up-to-date with her Pap smear  - follow up in 1 month        Diagnoses and all orders for this visit:    Annual physical exam  -     CBC and differential; Future  -     TSH, 3rd generation with Free T4 reflex; Future  -     Comprehensive metabolic panel; Future  -     Lipid panel; Future    Tinea pedis of right foot  -     terbinafine (LamISIL) 250 mg tablet; Take 1 tablet (250 mg total) by mouth daily for 14 days    Reactive depression  -     buPROPion (Wellbutrin XL) 150 mg 24 hr tablet; Take 1 tablet (150 mg total) by mouth every morning    Subclinical hypothyroidism    Other hyperlipidemia    BMI 35 0-35 9,adult          Subjective:      Patient ID: Dina Clark is a 29 y o  female      HPI  Patient presents for an annual physical exam     Last annual physical exam- 6/16/21    Past medical history-endometrial polyp, hyperlipidemia, seasonal allergies, PCOS, HLD     Past surgical history-D and C for endometrial polyps    Medications-none    Allergies-NKDA    Diet- mixture of bAlanced and junk food, drinks about 1 L of water daily    Exercise-none    Alcohol use- infrequently with a max one to 2 drinks    Caffeine and soda use- 2-3 cups of coffee daily, green tea and no soda    Nicotine use-  never    Recreational drug use- none    Work-    Sexual history, STD history and HIV testing-monogamous with male partner, STD hx - never, HIV testing - done and negative    Gynecological history/Prostate health/testicular health history- LMP - may 2022, irregular, last pap smear - last month,     Colonoscopy- N/A    Immunization history- up to date with the covid and tdap    Dental visit- every six months    Vision- myopia - glasses    Family history-  MGM - breast cancer,   fibrocystic ds of the breast - mom,   htn - mom  DM - dad,   thyroid ds - MGM  hld - mom    Today, patient  also complains of a scaly rash on the bottom of her right foot  Of note, she states that she once used and anti fungal cream and it resolved but came back  She would like to have more treatment for it  She also complains of feelings of depression  She states that she follows up with psychotherapy and that the psychotherapist feels that she needs a medication for depression  She admits to occasional headaches but admits that she does not drink enough water  She denies fever, chills, night sweats, headache, dizziness, chest pain, shortness of breath, palpitations, nausea, vomiting abdominal pain, constipation, hematochezia, hematuria, feelings of anxiety  The following portions of the patient's history were reviewed and updated as appropriate:   She  has a past medical history of Depression, Disease of thyroid gland, Hyperlipidemia, Hypothyroidism, and Polycystic ovary syndrome    She   Patient Active Problem List    Diagnosis Date Noted    Gastroesophageal reflux disease without esophagitis 2021    PCOS (polycystic ovarian syndrome) 2020    Tinea pedis 2020    Gynecologic exam normal 2020    Need for influenza vaccination 2020    Influenza vaccination declined by patient 2020    Other hyperlipidemia 2020    Annual physical exam 07/15/2020    BMI 35 0-35 9,adult 07/15/2020    Reactive depression 07/15/2020    Generalized anxiety disorder 07/15/2020    Panic attacks 07/15/2020    Endometrial polyp 2019    Menorrhagia with irregular cycle 2019    Irregular menses 2019    Screening examination for STD (sexually transmitted disease) 07/26/2019    Dyspareunia in female 07/26/2019    Subclinical hypothyroidism 05/25/2018     She  has a past surgical history that includes [place] tooth extraction and pr hysteroscopy,w/endo bx (N/A, 11/19/2019)  Her family history includes Alcohol abuse in her maternal grandfather; Arthritis in her maternal grandmother; Breast cancer in her maternal grandmother; Breast cancer additional onset in her maternal grandmother; Cancer in her maternal grandmother; Dementia in her paternal grandmother; Diabetes in her father; Hearing loss in her maternal grandmother; Hypertension in her mother; No Known Problems in her brother, paternal grandfather, and sister; Rheum arthritis in her maternal aunt; Thyroid disease in her maternal grandmother  She  reports that she has never smoked  She has never used smokeless tobacco  She reports current alcohol use  She reports that she does not use drugs  Current Outpatient Medications   Medication Sig Dispense Refill    buPROPion (Wellbutrin XL) 150 mg 24 hr tablet Take 1 tablet (150 mg total) by mouth every morning 30 tablet 5    terbinafine (LamISIL) 250 mg tablet Take 1 tablet (250 mg total) by mouth daily for 14 days 14 tablet 0     No current facility-administered medications for this visit  Current Outpatient Medications on File Prior to Visit   Medication Sig    [DISCONTINUED] medroxyPROGESTERone (PROVERA) 10 mg tablet Take 1 tablet (10 mg total) by mouth daily (Patient not taking: Reported on 7/7/2022)     No current facility-administered medications on file prior to visit  She has No Known Allergies       Review of Systems   Constitutional: Negative for activity change, chills, fatigue, fever and unexpected weight change  HENT: Negative for ear pain, postnasal drip, rhinorrhea, sinus pressure and sore throat  Eyes: Negative for pain     Respiratory: Negative for cough, choking, chest tightness, shortness of breath and wheezing  Cardiovascular: Negative for chest pain, palpitations and leg swelling  Gastrointestinal: Positive for diarrhea (occasionally)  Negative for abdominal pain, constipation, nausea and vomiting  Genitourinary: Negative for dysuria and hematuria  Musculoskeletal: Negative for arthralgias, back pain, gait problem, joint swelling, myalgias and neck stiffness  Skin: Positive for rash ( fungal rash on the sole of her right foot)  Negative for pallor  Neurological: Positive for headaches (occasionally )  Negative for dizziness, tremors, seizures, syncope and light-headedness  Hematological: Negative for adenopathy  Psychiatric/Behavioral: Positive for dysphoric mood  Negative for behavioral problems  Objective:      /58 (BP Location: Left arm, Patient Position: Sitting, Cuff Size: Standard)   Pulse 81   Temp 98 1 °F (36 7 °C) (Temporal)   Ht 5' 5" (1 651 m)   Wt 95 7 kg (211 lb)   SpO2 98%   BMI 35 11 kg/m²          Physical Exam  Constitutional:       General: She is not in acute distress  Appearance: She is well-developed  She is not diaphoretic  HENT:      Head: Normocephalic and atraumatic  Right Ear: External ear normal       Left Ear: External ear normal       Nose: Mucosal edema and congestion present  Mouth/Throat:      Mouth: Mucous membranes are dry  Pharynx: Posterior oropharyngeal erythema present  No oropharyngeal exudate  Eyes:      General: No scleral icterus  Right eye: No discharge  Left eye: No discharge  Conjunctiva/sclera: Conjunctivae normal       Pupils: Pupils are equal, round, and reactive to light  Neck:      Thyroid: No thyromegaly  Vascular: No JVD  Trachea: No tracheal deviation  Cardiovascular:      Rate and Rhythm: Normal rate and regular rhythm  Heart sounds: Normal heart sounds  No murmur heard  No friction rub  No gallop     Pulmonary:      Effort: Pulmonary effort is normal  No respiratory distress  Breath sounds: Normal breath sounds  No wheezing or rales  Chest:      Chest wall: No tenderness  Abdominal:      General: Bowel sounds are normal  There is no distension  Palpations: Abdomen is soft  There is no mass  Tenderness: There is no abdominal tenderness  There is no guarding or rebound  Musculoskeletal:         General: No tenderness or deformity  Normal range of motion  Cervical back: Normal range of motion and neck supple  Lymphadenopathy:      Cervical: No cervical adenopathy  Skin:     General: Skin is warm and dry  Coloration: Skin is not pale  Findings: Rash present  No erythema  Rash is scaling (scaly rash of the right foot concerning for tinea pedis)  Neurological:      Mental Status: She is alert and oriented to person, place, and time  Cranial Nerves: No cranial nerve deficit  Motor: No abnormal muscle tone  Coordination: Coordination normal       Deep Tendon Reflexes: Reflexes are normal and symmetric  Psychiatric:         Mood and Affect: Mood is depressed (Depressed affect)           Behavior: Behavior normal            Annual Exam on 04/06/2022   Component Date Value Ref Range Status    Case Report 04/06/2022    Final                    Value:Gynecologic Cytology Report                       Case: [id number]                                  Authorizing Provider:  CHARBEL Dill    Collected:           04/06/2022 1634              Ordering Location:     [facility] Received:            04/06/2022 1634                                     OB/GYN [place]                                                                First Screen:          MERCEDES Maurice                                                    Specimen:    LIQUID-BASED PAP, SCREENING, Cervix                                                        Primary Interpretation 04/06/2022 Negative for intraepithelial lesion or malignancy   Final    Specimen Adequacy 04/06/2022 Satisfactory for evaluation  Endocervical/transformation zone component present  Final    Additional Information 04/06/2022    Final                    Value: This result contains rich text formatting which cannot be displayed here   LMP 04/06/2022 1/1/2022   Final    HPV Other HR 04/06/2022 Negative  Negative Final    HPV types: 11,15,84,43,24,97,62,22,68,63,01 and 68 DNA are undetectable or below the pre-set threshold      HPV16 04/06/2022 Negative  Negative Final    HPV18 04/06/2022 Negative  Negative Final

## 2022-07-07 NOTE — PROGRESS NOTES
Assessment/Plan:    Tinea pedis of right foot   -will start patient on terbinafine 250 mg daily for 14 days   -she was counseled to keep her feet clean and dry    Reactive depression  -patient would like to start an antidepressant   -will start on Wellbutrin 150 mg daily  -continue with psychotherapy   -follow-up in 1 month    Subclinical hypothyroidism  -stable  -will recheck a TSH especially with her history of depression     Hyperlipidemia  -stable   -will order repeat lipid panel  -continue with heart healthy diet       Diagnoses and all orders for this visit:    Annual physical exam  -     CBC and differential; Future  -     TSH, 3rd generation with Free T4 reflex; Future  -     Comprehensive metabolic panel; Future  -     Lipid panel; Future    Tinea pedis of right foot  -     terbinafine (LamISIL) 250 mg tablet; Take 1 tablet (250 mg total) by mouth daily for 14 days    Reactive depression  -     buPROPion (Wellbutrin XL) 150 mg 24 hr tablet; Take 1 tablet (150 mg total) by mouth every morning    Subclinical hypothyroidism    Other hyperlipidemia    BMI 35 0-35 9,adult          BMI Counseling: Body mass index is 35 11 kg/m²  The BMI is above normal  Nutrition recommendations include consuming healthier snacks, limiting drinks that contain sugar, reducing intake of saturated and trans fat and reducing intake of cholesterol  Exercise recommendations include moderate physical activity 150 minutes/week  No pharmacotherapy was ordered  Patient referred to PCP  Rationale for BMI follow-up plan is due to patient being overweight or obese  Subjective:      Patient ID: Deion Early is a 29 y o  female  HPI  Patient presents for an annual physical exam but also has complaints  Today, patient  also complains of a scaly rash on the bottom of her right foot  Of note, she states that she once used an anti fungal cream and it resolved but came back  She would like to have more treatment for it    She also complains of feelings of depression  She states that she follows up with psychotherapy and that the psychotherapist feels that she needs a medication for depression  She admits to occasional headaches but admits that she does not drink enough water  She denies fever, chills, night sweats, headache, dizziness, chest pain, shortness of breath, palpitations, nausea, vomiting abdominal pain, constipation, hematochezia, hematuria, feelings of anxiety  The following portions of the patient's history were reviewed and updated as appropriate:   She  has a past medical history of Depression, Disease of thyroid gland, Hyperlipidemia, Hypothyroidism, and Polycystic ovary syndrome  She   Patient Active Problem List    Diagnosis Date Noted    Gastroesophageal reflux disease without esophagitis 06/16/2021    PCOS (polycystic ovarian syndrome) 11/19/2020    Tinea pedis 11/19/2020    Gynecologic exam normal 11/19/2020    Need for influenza vaccination 11/05/2020    Influenza vaccination declined by patient 11/05/2020    Other hyperlipidemia 07/29/2020    Annual physical exam 07/15/2020    BMI 35 0-35 9,adult 07/15/2020    Reactive depression 07/15/2020    Generalized anxiety disorder 07/15/2020    Panic attacks 07/15/2020    Endometrial polyp 11/12/2019    Menorrhagia with irregular cycle 07/26/2019    Irregular menses 07/26/2019    Screening examination for STD (sexually transmitted disease) 07/26/2019    Dyspareunia in female 07/26/2019    Subclinical hypothyroidism 05/25/2018     She  has a past surgical history that includes [place] tooth extraction and pr hysteroscopy,w/endo bx (N/A, 11/19/2019)    Her family history includes Alcohol abuse in her maternal grandfather; Arthritis in her maternal grandmother; Breast cancer in her maternal grandmother; Breast cancer additional onset in her maternal grandmother; Cancer in her maternal grandmother; Dementia in her paternal grandmother; Diabetes in her father; Hearing loss in her maternal grandmother; Hypertension in her mother; No Known Problems in her brother, paternal grandfather, and sister; Rheum arthritis in her maternal aunt; Thyroid disease in her maternal grandmother  She  reports that she has never smoked  She has never used smokeless tobacco  She reports current alcohol use  She reports that she does not use drugs  Current Outpatient Medications   Medication Sig Dispense Refill    buPROPion (Wellbutrin XL) 150 mg 24 hr tablet Take 1 tablet (150 mg total) by mouth every morning 30 tablet 5    terbinafine (LamISIL) 250 mg tablet Take 1 tablet (250 mg total) by mouth daily for 14 days 14 tablet 0     No current facility-administered medications for this visit  Current Outpatient Medications on File Prior to Visit   Medication Sig    [DISCONTINUED] medroxyPROGESTERone (PROVERA) 10 mg tablet Take 1 tablet (10 mg total) by mouth daily (Patient not taking: Reported on 7/7/2022)     No current facility-administered medications on file prior to visit  She has No Known Allergies       Review of Systems   Constitutional: Negative for activity change, chills, fatigue, fever and unexpected weight change  HENT: Negative for ear pain, postnasal drip, rhinorrhea, sinus pressure and sore throat  Eyes: Negative for pain  Respiratory: Negative for cough, choking, chest tightness, shortness of breath and wheezing  Cardiovascular: Negative for chest pain, palpitations and leg swelling  Gastrointestinal: Positive for diarrhea  Negative for abdominal pain, constipation, nausea and vomiting  Genitourinary: Negative for dysuria and hematuria  Musculoskeletal: Negative for arthralgias, back pain, gait problem, joint swelling, myalgias and neck stiffness  Skin: Positive for rash  Negative for pallor  Neurological: Positive for headaches  Negative for dizziness, tremors, seizures, syncope and light-headedness     Hematological: Negative for adenopathy  Psychiatric/Behavioral: Positive for dysphoric mood  Negative for behavioral problems  Objective:      /58 (BP Location: Left arm, Patient Position: Sitting, Cuff Size: Standard)   Pulse 81   Temp 98 1 °F (36 7 °C) (Temporal)   Ht 5' 5" (1 651 m)   Wt 95 7 kg (211 lb)   SpO2 98%   BMI 35 11 kg/m²          Physical Exam  Constitutional:       General: She is not in acute distress  Appearance: She is well-developed  She is not diaphoretic  HENT:      Head: Normocephalic and atraumatic  Right Ear: External ear normal       Left Ear: External ear normal       Nose: Nose normal       Mouth/Throat:      Pharynx: Posterior oropharyngeal erythema (Dry mucous membranes with oropharyngeal erythema and Mallampati class 4 oropharynx) present  No oropharyngeal exudate  Eyes:      General: No scleral icterus  Right eye: No discharge  Left eye: No discharge  Conjunctiva/sclera: Conjunctivae normal       Pupils: Pupils are equal, round, and reactive to light  Neck:      Thyroid: No thyromegaly  Vascular: No JVD  Trachea: No tracheal deviation  Cardiovascular:      Rate and Rhythm: Normal rate and regular rhythm  Heart sounds: Normal heart sounds  No murmur heard  No friction rub  No gallop  Pulmonary:      Effort: Pulmonary effort is normal  No respiratory distress  Breath sounds: Normal breath sounds  No wheezing or rales  Chest:      Chest wall: No tenderness  Abdominal:      General: Bowel sounds are normal  There is no distension  Palpations: Abdomen is soft  There is no mass  Tenderness: There is no abdominal tenderness  There is no guarding or rebound  Musculoskeletal:         General: No tenderness or deformity  Normal range of motion  Cervical back: Normal range of motion and neck supple  Lymphadenopathy:      Cervical: No cervical adenopathy  Skin:     General: Skin is warm and dry        Coloration: Skin is not pale  Findings: Rash ( scaly rash on the sole of the right foot) present  No erythema  Neurological:      Mental Status: She is alert and oriented to person, place, and time  Cranial Nerves: No cranial nerve deficit  Motor: No abnormal muscle tone  Coordination: Coordination normal       Deep Tendon Reflexes: Reflexes are normal and symmetric  Psychiatric:         Mood and Affect: Mood is depressed ( depressed affect)  Behavior: Behavior normal            Annual Exam on 04/06/2022   Component Date Value Ref Range Status    Case Report 04/06/2022    Final                    Value:Gynecologic Cytology Report                       Case: [id number]                                  Authorizing Provider:  CHARBEL Murcia    Collected:           04/06/2022 1634              Ordering Location:     [facility] Received:            04/06/2022 1634                                     OB/GYN [place]                                                                First Screen:          MERCEDES Osuna                                                    Specimen:    LIQUID-BASED PAP, SCREENING, Cervix                                                        Primary Interpretation 04/06/2022 Negative for intraepithelial lesion or malignancy   Final    Specimen Adequacy 04/06/2022 Satisfactory for evaluation  Endocervical/transformation zone component present  Final    Additional Information 04/06/2022    Final                    Value: This result contains rich text formatting which cannot be displayed here   LMP 04/06/2022 1/1/2022   Final    HPV Other HR 04/06/2022 Negative  Negative Final    HPV types: 39,54,82,73,50,02,85,56,69,85,12 and 68 DNA are undetectable or below the pre-set threshold      HPV16 04/06/2022 Negative  Negative Final    HPV18 04/06/2022 Negative  Negative Final

## 2022-07-29 ENCOUNTER — OFFICE VISIT (OUTPATIENT)
Dept: URGENT CARE | Facility: CLINIC | Age: 32
End: 2022-07-29
Payer: COMMERCIAL

## 2022-07-29 VITALS
RESPIRATION RATE: 18 BRPM | HEIGHT: 65 IN | HEART RATE: 96 BPM | OXYGEN SATURATION: 97 % | TEMPERATURE: 98.5 F | BODY MASS INDEX: 35.32 KG/M2 | WEIGHT: 212 LBS

## 2022-07-29 DIAGNOSIS — J06.9 VIRAL URI WITH COUGH: Primary | ICD-10-CM

## 2022-07-29 LAB
SARS-COV-2 AG UPPER RESP QL IA: NEGATIVE
VALID CONTROL: NORMAL

## 2022-07-29 PROCEDURE — 99213 OFFICE O/P EST LOW 20 MIN: CPT | Performed by: PHYSICIAN ASSISTANT

## 2022-07-29 PROCEDURE — 87811 SARS-COV-2 COVID19 W/OPTIC: CPT | Performed by: PHYSICIAN ASSISTANT

## 2022-07-29 RX ORDER — BENZONATATE 200 MG/1
200 CAPSULE ORAL 3 TIMES DAILY PRN
Qty: 20 CAPSULE | Refills: 0 | Status: SHIPPED | OUTPATIENT
Start: 2022-07-29

## 2022-07-29 NOTE — PROGRESS NOTES
[facility]        NAME: Dina Clark is a 29 y o  female  : 1990    MRN: [id number]  DATE: 2022  TIME: 10:05 AM    Assessment and Plan   Viral URI with cough [J06 9]  1  Viral URI with cough  benzonatate (TESSALON) 200 MG capsule    Poct Covid 19 Rapid Antigen Test         Patient Instructions     Patient Instructions   Rapid COVID antigen test negative  Can take Tessalon as instructed  Recommend maintaining adequate fluid hydration and rest         Follow up with PCP in 3-5 days  Proceed to  ER if symptoms worsen  Chief Complaint     Chief Complaint   Patient presents with    URI     Cough, body aches,sore throat, headache, ear pain began Monday         History of Present Illness       Patient is a 51-year-old female presenting today with cold symptoms x5 days  Patient notes she recently returned home from [facility] a few days ago when she developed body aches, cough and fatigue, has been taking over-the-counter DayQuil NyQuil which provides temporary resolution of her symptoms  Notes she was also febrile a couple days ago with T-max of 102°, notes she is been afebrile for the last couple days and is feeling better today than initial onset  Denies any known sick contacts  Denies chest tightness, SOB, N/V/D, lightheadedness, dizziness  Review of Systems   Review of Systems   Constitutional: Positive for fever  Negative for chills and fatigue  HENT: Positive for congestion  Negative for postnasal drip and sore throat  Eyes: Negative for redness and itching  Respiratory: Positive for cough  Negative for chest tightness and shortness of breath  Cardiovascular: Negative for chest pain  Gastrointestinal: Negative for diarrhea, nausea and vomiting  Musculoskeletal: Positive for myalgias  Neurological: Negative for light-headedness and headaches           Current Medications       Current Outpatient Medications:     benzonatate (TESSALON) 200 MG capsule, Take 1 capsule (200 mg total) by mouth 3 (three) times a day as needed for cough, Disp: 20 capsule, Rfl: 0    buPROPion (Wellbutrin XL) 150 mg 24 hr tablet, Take 1 tablet (150 mg total) by mouth every morning (Patient not taking: Reported on 7/29/2022), Disp: 30 tablet, Rfl: 5    Current Allergies     Allergies as of 07/29/2022    (No Known Allergies)            The following portions of the patient's history were reviewed and updated as appropriate: allergies, current medications, past family history, past medical history, past social history, past surgical history and problem list      Past Medical History:   Diagnosis Date    Depression     Disease of thyroid gland     Hyperlipidemia     Hypothyroidism     Polycystic ovary syndrome        Past Surgical History:   Procedure Laterality Date    [place] HYSTEROSCOPY,W/ENDO BX N/A 11/19/2019    Procedure: DILATATION AND CURETTAGE (D&C) WITH HYSTEROSCOPY;  Surgeon: Rios Paige MD;  [facility];  Service: Gynecology    WISDOM TOOTH EXTRACTION         Family History   Problem Relation Age of Onset    Hypertension Mother     Diabetes Father     Breast cancer Maternal Grandmother     Arthritis Maternal Grandmother     Cancer Maternal Grandmother         breast cancer    Hearing loss Maternal Grandmother     Thyroid disease Maternal Grandmother     Breast cancer additional onset Maternal Grandmother     No Known Problems Sister     No Known Problems Brother     Alcohol abuse Maternal Grandfather     Dementia Paternal Grandmother     No Known Problems Paternal Grandfather     Rheum arthritis Maternal Aunt          Medications have been verified  Objective   Pulse 96   Temp 98 5 °F (36 9 °C)   Resp 18   Ht 5' 5" (1 651 m)   Wt 96 2 kg (212 lb)   LMP 05/29/2022   SpO2 97%   BMI 35 28 kg/m²        Physical Exam     Physical Exam  Vitals and nursing note reviewed  Constitutional:       General: She is not in acute distress  Appearance: Normal appearance  She is not ill-appearing  HENT:      Head: Normocephalic and atraumatic  Right Ear: Tympanic membrane, ear canal and external ear normal       Left Ear: Tympanic membrane, ear canal and external ear normal       Nose: Congestion present  Mouth/Throat:      Mouth: Mucous membranes are moist       Pharynx: Oropharynx is clear  Eyes:      Conjunctiva/sclera: Conjunctivae normal    Cardiovascular:      Rate and Rhythm: Normal rate and regular rhythm  Pulses: Normal pulses  Heart sounds: Normal heart sounds  Pulmonary:      Effort: Pulmonary effort is normal       Breath sounds: Normal breath sounds  Lymphadenopathy:      Cervical: No cervical adenopathy  Skin:     General: Skin is warm  Capillary Refill: Capillary refill takes less than 2 seconds  Neurological:      General: No focal deficit present  Mental Status: She is alert and oriented to person, place, and time

## 2022-07-29 NOTE — PATIENT INSTRUCTIONS
Rapid COVID antigen test negative  Can take Tessalon as instructed    Recommend maintaining adequate fluid hydration and rest

## 2022-09-02 ENCOUNTER — APPOINTMENT (OUTPATIENT)
Dept: LAB | Facility: HOSPITAL | Age: 32
End: 2022-09-02
Payer: COMMERCIAL

## 2022-09-02 DIAGNOSIS — Z00.00 ANNUAL PHYSICAL EXAM: ICD-10-CM

## 2022-09-02 LAB
ALBUMIN SERPL BCP-MCNC: 3.5 G/DL (ref 3.5–5)
ALP SERPL-CCNC: 95 U/L (ref 46–116)
ALT SERPL W P-5'-P-CCNC: 12 U/L (ref 12–78)
ANION GAP SERPL CALCULATED.3IONS-SCNC: 8 MMOL/L (ref 4–13)
AST SERPL W P-5'-P-CCNC: 13 U/L (ref 5–45)
BASOPHILS # BLD AUTO: 0.04 THOUSANDS/ΜL (ref 0–0.1)
BASOPHILS NFR BLD AUTO: 1 % (ref 0–1)
BILIRUB SERPL-MCNC: 0.24 MG/DL (ref 0.2–1)
BUN SERPL-MCNC: 14 MG/DL (ref 5–25)
CALCIUM SERPL-MCNC: 8.6 MG/DL (ref 8.3–10.1)
CHLORIDE SERPL-SCNC: 104 MMOL/L (ref 96–108)
CHOLEST SERPL-MCNC: 211 MG/DL
CO2 SERPL-SCNC: 28 MMOL/L (ref 21–32)
CREAT SERPL-MCNC: 0.91 MG/DL (ref 0.6–1.3)
EOSINOPHIL # BLD AUTO: 0.2 THOUSAND/ΜL (ref 0–0.61)
EOSINOPHIL NFR BLD AUTO: 4 % (ref 0–6)
ERYTHROCYTE [DISTWIDTH] IN BLOOD BY AUTOMATED COUNT: 13.5 % (ref 11.6–15.1)
GFR SERPL CREATININE-BSD FRML MDRD: 84 ML/MIN/1.73SQ M
GLUCOSE P FAST SERPL-MCNC: 94 MG/DL (ref 65–99)
HCT VFR BLD AUTO: 39.5 % (ref 34.8–46.1)
HDLC SERPL-MCNC: 55 MG/DL
HGB BLD-MCNC: 12.6 G/DL (ref 11.5–15.4)
IMM GRANULOCYTES # BLD AUTO: 0.01 THOUSAND/UL (ref 0–0.2)
IMM GRANULOCYTES NFR BLD AUTO: 0 % (ref 0–2)
LDLC SERPL CALC-MCNC: 138 MG/DL (ref 0–100)
LYMPHOCYTES # BLD AUTO: 2.48 THOUSANDS/ΜL (ref 0.6–4.47)
LYMPHOCYTES NFR BLD AUTO: 43 % (ref 14–44)
MCH RBC QN AUTO: 29.2 PG (ref 26.8–34.3)
MCHC RBC AUTO-ENTMCNC: 31.9 G/DL (ref 31.4–37.4)
MCV RBC AUTO: 91 FL (ref 82–98)
MONOCYTES # BLD AUTO: 0.59 THOUSAND/ΜL (ref 0.17–1.22)
MONOCYTES NFR BLD AUTO: 10 % (ref 4–12)
NEUTROPHILS # BLD AUTO: 2.47 THOUSANDS/ΜL (ref 1.85–7.62)
NEUTS SEG NFR BLD AUTO: 42 % (ref 43–75)
NONHDLC SERPL-MCNC: 156 MG/DL
NRBC BLD AUTO-RTO: 0 /100 WBCS
PLATELET # BLD AUTO: 399 THOUSANDS/UL (ref 149–390)
PMV BLD AUTO: 9.5 FL (ref 8.9–12.7)
POTASSIUM SERPL-SCNC: 3.6 MMOL/L (ref 3.5–5.3)
PROT SERPL-MCNC: 7.8 G/DL (ref 6.4–8.4)
RBC # BLD AUTO: 4.32 MILLION/UL (ref 3.81–5.12)
SODIUM SERPL-SCNC: 140 MMOL/L (ref 135–147)
TRIGL SERPL-MCNC: 90 MG/DL
TSH SERPL DL<=0.05 MIU/L-ACNC: 3.18 UIU/ML (ref 0.45–4.5)
WBC # BLD AUTO: 5.79 THOUSAND/UL (ref 4.31–10.16)

## 2022-09-02 PROCEDURE — 80061 LIPID PANEL: CPT

## 2022-09-02 PROCEDURE — 80053 COMPREHEN METABOLIC PANEL: CPT

## 2022-09-02 PROCEDURE — 84443 ASSAY THYROID STIM HORMONE: CPT

## 2022-09-02 PROCEDURE — 85025 COMPLETE CBC W/AUTO DIFF WBC: CPT

## 2022-09-02 PROCEDURE — 36415 COLL VENOUS BLD VENIPUNCTURE: CPT

## 2022-12-01 DIAGNOSIS — N91.2 AMENORRHEA: Primary | ICD-10-CM

## 2022-12-06 RX ORDER — MEDROXYPROGESTERONE ACETATE 10 MG/1
TABLET ORAL
Qty: 10 TABLET | Refills: 0 | Status: SHIPPED | OUTPATIENT
Start: 2022-12-06

## 2022-12-06 NOTE — TELEPHONE ENCOUNTER
Pt called back and states she has not had a period in 3 months and does need refills  States she was advised to call for medication if no menses

## 2022-12-15 ENCOUNTER — OFFICE VISIT (OUTPATIENT)
Dept: INTERNAL MEDICINE CLINIC | Age: 32
End: 2022-12-15

## 2022-12-15 VITALS
SYSTOLIC BLOOD PRESSURE: 120 MMHG | OXYGEN SATURATION: 99 % | BODY MASS INDEX: 36.15 KG/M2 | TEMPERATURE: 98.4 F | HEART RATE: 77 BPM | DIASTOLIC BLOOD PRESSURE: 90 MMHG | WEIGHT: 217 LBS | HEIGHT: 65 IN

## 2022-12-15 DIAGNOSIS — R03.0 TRANSIENT ELEVATED BLOOD PRESSURE: ICD-10-CM

## 2022-12-15 DIAGNOSIS — K21.9 GASTROESOPHAGEAL REFLUX DISEASE WITHOUT ESOPHAGITIS: ICD-10-CM

## 2022-12-15 DIAGNOSIS — M25.561 ACUTE PAIN OF RIGHT KNEE: ICD-10-CM

## 2022-12-15 DIAGNOSIS — M79.672 LEFT FOOT PAIN: ICD-10-CM

## 2022-12-15 DIAGNOSIS — F32.9 REACTIVE DEPRESSION: Primary | ICD-10-CM

## 2022-12-15 RX ORDER — MELOXICAM 7.5 MG/1
7.5 TABLET ORAL DAILY
Qty: 30 TABLET | Refills: 0 | Status: SHIPPED | OUTPATIENT
Start: 2022-12-15

## 2022-12-15 NOTE — LETTER
December 15, 2022     Patient: Lamont White  YOB: 1990  Date of Visit: 12/15/2022      To Whom it May Concern:    Candance Pleasure is under my professional care  Jessenia Danrashida was seen in my office on 12/15/2022  Charon Dancer may return to work with limitations on 12/19/2022   Candance Pleasure should be allowed to go and take her medication between 9 am and 10 am everyday  If you have any questions or concerns, please don't hesitate to call           Sincerely,          Lorri Carey DO        CC: No Recipients
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
## 2023-01-09 DIAGNOSIS — N91.2 AMENORRHEA: Primary | ICD-10-CM

## 2023-01-09 DIAGNOSIS — N92.6 IRREGULAR MENSES: ICD-10-CM

## 2023-01-09 DIAGNOSIS — E28.2 PCOS (POLYCYSTIC OVARIAN SYNDROME): ICD-10-CM

## 2023-01-09 RX ORDER — NORETHINDRONE ACETATE AND ETHINYL ESTRADIOL 1; .02 MG/1; MG/1
1 TABLET ORAL DAILY
COMMUNITY
End: 2023-01-09 | Stop reason: SDUPTHER

## 2023-01-09 RX ORDER — NORETHINDRONE ACETATE AND ETHINYL ESTRADIOL 1; .02 MG/1; MG/1
1 TABLET ORAL DAILY
Qty: 84 TABLET | Refills: 1 | Status: SHIPPED | OUTPATIENT
Start: 2023-01-09 | End: 2023-02-06

## 2023-01-09 NOTE — TELEPHONE ENCOUNTER
Pt sent message via [employer] and would like to go back on the birth control  She was doing cyclic provera which she took last month  She is also due to come in for her annual exam   Is this something that she can restart or should she wait until she schedules and annual to discuss?

## 2023-01-20 ENCOUNTER — HOSPITAL ENCOUNTER (OUTPATIENT)
Dept: RADIOLOGY | Facility: HOSPITAL | Age: 33
Discharge: HOME | End: 2023-01-20
Attending: INTERNAL MEDICINE

## 2023-01-20 DIAGNOSIS — M79.672 LEFT FOOT PAIN: ICD-10-CM

## 2023-01-20 DIAGNOSIS — M25.561 ACUTE PAIN OF RIGHT KNEE: ICD-10-CM

## 2023-01-24 ENCOUNTER — TELEPHONE (OUTPATIENT)
Dept: DERMATOLOGY | Facility: CLINIC | Age: 33
End: 2023-01-24

## 2023-01-25 ENCOUNTER — OFFICE VISIT (OUTPATIENT)
Dept: INTERNAL MEDICINE CLINIC | Age: 33
End: 2023-01-25

## 2023-01-25 VITALS
BODY MASS INDEX: 36.75 KG/M2 | HEART RATE: 84 BPM | OXYGEN SATURATION: 98 % | SYSTOLIC BLOOD PRESSURE: 130 MMHG | WEIGHT: 220.6 LBS | HEIGHT: 65 IN | TEMPERATURE: 97.2 F | DIASTOLIC BLOOD PRESSURE: 84 MMHG

## 2023-01-25 DIAGNOSIS — F32.9 REACTIVE DEPRESSION: Primary | ICD-10-CM

## 2023-01-25 DIAGNOSIS — M25.561 CHRONIC PAIN OF RIGHT KNEE: ICD-10-CM

## 2023-01-25 DIAGNOSIS — R03.0 TRANSIENT ELEVATED BLOOD PRESSURE: ICD-10-CM

## 2023-01-25 DIAGNOSIS — G89.29 CHRONIC PAIN OF RIGHT KNEE: ICD-10-CM

## 2023-01-25 DIAGNOSIS — M79.672 LEFT FOOT PAIN: ICD-10-CM

## 2023-01-25 NOTE — PROGRESS NOTES
Assessment/Plan:    Reactive depression  -Improved but not optimally controlled  - I offered to increase the dose of her medication but patient does not want to increase the dose of her Wellbutrin at this time so we will continue with 150 mg of Wellbutrin and she was counseled to try and take it in the morning and to possibly set an alarm for herself on her smart phone to ensure that she always wakes up to take it in the morning, even on days that she does not work  -Continue with psychotherapy    Chronic right knee pain  -Pain is chronic and intermittent, worse when patient flexes her knee and a review of knee x-ray shows no acute osseous abnormality  -We will order an MRI of the right knee and follow-up with the results    Left foot pain  -Improving  -Left foot x-ray showed no acute osseous abnormality  -Continue with analgesics as needed  -Avoid over stressing the feet    Transient elevated blood pressure  -Patient does have a family history of essential hypertension in her mom  -She was counseled to monitor her blood pressure at home and we will continue to monitor her blood pressure in the clinic and if blood pressure is consistently above 130/80, patient would likely need antihypertensives  -She will benefit from sticking to a low-salt diet and cutting down on caffeine     Diagnoses and all orders for this visit:    Reactive depression    Chronic pain of right knee  -     MRI knee right  wo contrast; Future    Left foot pain    Transient elevated blood pressure    BMI 36 0-36 9,adult      BMI Counseling: Body mass index is 36 71 kg/m²  The BMI is above normal  Nutrition recommendations include consuming healthier snacks, limiting drinks that contain sugar, reducing intake of saturated and trans fat and reducing intake of cholesterol  Exercise recommendations include moderate physical activity 150 minutes/week  No pharmacotherapy was ordered  Patient referred to PCP   Rationale for BMI follow-up plan is due to patient being overweight or obese  Subjective:      Patient ID: Lita Landrum is a 34 y o  female  HPI  Patient presents for follow-up visit regarding her right knee pain, left foot pain, reactive depression, elevated blood pressure  She states that her right knee pain got a little worse and now it is getting better and left foot pain is much better  Left foot pain is worse with a lot of walking  She states that her right knee pain is much worse when she flexes her knee and the last time she had the pain was last week  Spends a lot of time on her feet at work  Mood is not great   welbutrin helped but she is not where she wants to be but states that she does not like to take medications  Stresses are same in her life  Still sees a counsellor   She still takes the welbutrin at 5 pm and she will try to see if she can take it early in the am using her alarm  She does not want to increase the dose of Wellbutrin at this time  She currently takes 150 mg daily  Works 3 days some weeks and 4 days some weeks   Used to walk on the [facility] for an hour a day for 4-5 days while exercising but has not exercised in a while and would like to exercise  She had x-rays done and wants to review the results  She admits to palpitations, feelings of anxiety and depression but denies fever, chills, night sweats, headache, dizziness, chest pain, shortness of breath, nausea, vomiting, abdominal pain, diarrhea, constipation  The following portions of the patient's history were reviewed and updated as appropriate:   She  has a past medical history of Depression, Disease of thyroid gland, Hyperlipidemia, Hypothyroidism, and Polycystic ovary syndrome    She   Patient Active Problem List    Diagnosis Date Noted   • Acute pain of right knee 12/15/2022   • Left foot pain 12/15/2022   • Transient elevated blood pressure 12/15/2022   • Gastroesophageal reflux disease without esophagitis 06/16/2021   • PCOS (polycystic ovarian syndrome) 11/19/2020   • Tinea pedis 11/19/2020   • Gynecologic exam normal 11/19/2020   • Need for influenza vaccination 11/05/2020   • Influenza vaccination declined by patient 11/05/2020   • Other hyperlipidemia 07/29/2020   • Annual physical exam 07/15/2020   • BMI 36 0-36 9,adult 07/15/2020   • Reactive depression 07/15/2020   • Generalized anxiety disorder 07/15/2020   • Panic attacks 07/15/2020   • Endometrial polyp 11/12/2019   • Menorrhagia with irregular cycle 07/26/2019   • Irregular menses 07/26/2019   • Screening examination for STD (sexually transmitted disease) 07/26/2019   • Dyspareunia in female 07/26/2019   • Subclinical hypothyroidism 05/25/2018     She  has a past surgical history that includes [place] tooth extraction and pr hysteroscopy bx endometrium&/polypc w/wo d&c (N/A, 11/19/2019)  Her family history includes Alcohol abuse in her maternal grandfather; Arthritis in her maternal grandmother; Breast cancer in her maternal grandmother; Breast cancer additional onset in her maternal grandmother; Cancer in her maternal grandmother; Dementia in her paternal grandmother; Diabetes in her father; Hearing loss in her maternal grandmother; Hypertension in her mother; No Known Problems in her brother, paternal grandfather, and sister; Rheum arthritis in her maternal aunt; Thyroid disease in her maternal grandmother  She  reports that she has never smoked  She has never used smokeless tobacco  She reports current alcohol use  She reports that she does not use drugs    Current Outpatient Medications   Medication Sig Dispense Refill   • buPROPion (Wellbutrin XL) 150 mg 24 hr tablet Take 1 tablet (150 mg total) by mouth every morning 30 tablet 5   • norethindrone-ethinyl estradiol (MICROGESTIN 1/20) 1-20 MG-MCG per tablet Take 1 tablet by mouth daily for 28 days 84 tablet 1   • meloxicam (Mobic) 7 5 mg tablet Take 1 tablet (7 5 mg total) by mouth daily (Patient not taking: Reported on 1/25/2023) 30 tablet 0     No current facility-administered medications for this visit  Current Outpatient Medications on File Prior to Visit   Medication Sig   • buPROPion (Wellbutrin XL) 150 mg 24 hr tablet Take 1 tablet (150 mg total) by mouth every morning   • norethindrone-ethinyl estradiol (MICROGESTIN 1/20) 1-20 MG-MCG per tablet Take 1 tablet by mouth daily for 28 days   • meloxicam (Mobic) 7 5 mg tablet Take 1 tablet (7 5 mg total) by mouth daily (Patient not taking: Reported on 1/25/2023)     No current facility-administered medications on file prior to visit  She has No Known Allergies       Review of Systems   Constitutional: Negative for activity change, chills, fatigue, fever and unexpected weight change  HENT: Negative for ear pain, postnasal drip, rhinorrhea, sinus pressure and sore throat  Eyes: Negative for pain  Respiratory: Negative for cough, choking, chest tightness, shortness of breath and wheezing  Cardiovascular: Positive for palpitations  Negative for chest pain and leg swelling  Gastrointestinal: Negative for abdominal pain, constipation, diarrhea, nausea and vomiting  Genitourinary: Negative for dysuria and hematuria  Musculoskeletal: Positive for arthralgias (R knee pain worse on flexion, anterior recently and initially interior pain )  Negative for back pain, gait problem, joint swelling, myalgias and neck stiffness  Skin: Negative for pallor and rash  Neurological: Negative for dizziness, tremors, seizures, syncope, light-headedness and headaches  Hematological: Negative for adenopathy  Psychiatric/Behavioral: Positive for dysphoric mood  Negative for behavioral problems and sleep disturbance  The patient is nervous/anxious (with occasional panic attack )            Objective:      /84 (BP Location: Left arm, Patient Position: Sitting, Cuff Size: Large)   Pulse 84   Temp (!) 97 2 °F (36 2 °C) (Temporal)   Ht 5' 5" (1 651 m)   Wt 100 kg (220 lb 9 6 oz)   SpO2 98%   BMI 36 71 kg/m²          Physical Exam  Constitutional:       General: She is not in acute distress  Appearance: She is well-developed  She is obese  She is not diaphoretic  Comments: BMI 36 71   HENT:      Head: Normocephalic and atraumatic  Right Ear: External ear normal       Left Ear: External ear normal       Nose: Nose normal       Mouth/Throat:      Mouth: Mucous membranes are dry  Pharynx: Posterior oropharyngeal erythema (Oropharyngeal erythema with dry mucous membranes) present  No oropharyngeal exudate  Eyes:      General: No scleral icterus  Right eye: No discharge  Left eye: No discharge  Conjunctiva/sclera: Conjunctivae normal       Pupils: Pupils are equal, round, and reactive to light  Neck:      Thyroid: No thyromegaly  Vascular: No JVD  Trachea: No tracheal deviation  Cardiovascular:      Rate and Rhythm: Normal rate and regular rhythm  Heart sounds: Normal heart sounds  No murmur heard  No friction rub  No gallop  Pulmonary:      Effort: Pulmonary effort is normal  No respiratory distress  Breath sounds: Normal breath sounds  No wheezing or rales  Chest:      Chest wall: No tenderness  Abdominal:      General: Bowel sounds are normal  There is no distension  Palpations: Abdomen is soft  There is no mass  Tenderness: There is no abdominal tenderness  There is no guarding or rebound  Musculoskeletal:         General: No tenderness or deformity  Normal range of motion  Cervical back: Normal range of motion and neck supple  Right knee: Crepitus present  No swelling or erythema  No tenderness  Lymphadenopathy:      Cervical: No cervical adenopathy  Skin:     General: Skin is warm and dry  Coloration: Skin is not pale  Findings: No erythema or rash  Neurological:      Mental Status: She is alert and oriented to person, place, and time  Cranial Nerves: No cranial nerve deficit  Motor: No abnormal muscle tone  Coordination: Coordination normal       Deep Tendon Reflexes: Reflexes are normal and symmetric     Psychiatric:         Behavior: Behavior normal            Appointment on 09/02/2022   Component Date Value Ref Range Status   • WBC 09/02/2022 5 79  4 31 - 10 16 Thousand/uL Final   • RBC 09/02/2022 4 32  3 81 - 5 12 Million/uL Final   • Hemoglobin 09/02/2022 12 6  11 5 - 15 4 g/dL Final   • Hematocrit 09/02/2022 39 5  34 8 - 46 1 % Final   • MCV 09/02/2022 91  82 - 98 fL Final   • MCH 09/02/2022 29 2  26 8 - 34 3 pg Final   • MCHC 09/02/2022 31 9  31 4 - 37 4 g/dL Final   • RDW 09/02/2022 13 5  11 6 - 15 1 % Final   • MPV 09/02/2022 9 5  8 9 - 12 7 fL Final   • Platelets 73/63/6075 399 (H)  149 - 390 Thousands/uL Final   • nRBC 09/02/2022 0  /100 WBCs Final   • Neutrophils Relative 09/02/2022 42 (L)  43 - 75 % Final   • Immat GRANS % 09/02/2022 0  0 - 2 % Final   • Lymphocytes Relative 09/02/2022 43  14 - 44 % Final   • Monocytes Relative 09/02/2022 10  4 - 12 % Final   • Eosinophils Relative 09/02/2022 4  0 - 6 % Final   • Basophils Relative 09/02/2022 1  0 - 1 % Final   • Neutrophils Absolute 09/02/2022 2 47  1 85 - 7 62 Thousands/µL Final   • Immature Grans Absolute 09/02/2022 0 01  0 00 - 0 20 Thousand/uL Final   • Lymphocytes Absolute 09/02/2022 2 48  0 60 - 4 47 Thousands/µL Final   • Monocytes Absolute 09/02/2022 0 59  0 17 - 1 22 Thousand/µL Final   • Eosinophils Absolute 09/02/2022 0 20  0 00 - 0 61 Thousand/µL Final   • Basophils Absolute 09/02/2022 0 04  0 00 - 0 10 Thousands/µL Final   • TSH 3RD GENERATON 09/02/2022 3 179  0 450 - 4 500 uIU/mL Final    The recommended reference ranges for TSH during pregnancy are as follows:   First trimester 0 1 to 2 5 uIU/mL   Second trimester  0 2 to 3 0 uIU/mL   Third trimester 0 3 to 3 0 uIU/m    Note: Normal ranges may not apply to patients who are transgender, non-binary, or whose legal sex, sex at birth, and gender identity differ  Adult TSH (3rd generation) reference range follows the recommended guidelines of the American Thyroid Association, January, 2020  • Sodium 09/02/2022 140  135 - 147 mmol/L Final   • Potassium 09/02/2022 3 6  3 5 - 5 3 mmol/L Final   • Chloride 09/02/2022 104  96 - 108 mmol/L Final   • CO2 09/02/2022 28  21 - 32 mmol/L Final   • ANION GAP 09/02/2022 8  4 - 13 mmol/L Final   • BUN 09/02/2022 14  5 - 25 mg/dL Final   • Creatinine 09/02/2022 0 91  0 60 - 1 30 mg/dL Final    Standardized to IDMS reference method   • Glucose, Fasting 09/02/2022 94  65 - 99 mg/dL Final    Specimen collection should occur prior to Sulfasalazine administration due to the potential for falsely depressed results  Specimen collection should occur prior to Sulfapyridine administration due to the potential for falsely elevated results  • Calcium 09/02/2022 8 6  8 3 - 10 1 mg/dL Final   • AST 09/02/2022 13  5 - 45 U/L Final    Specimen collection should occur prior to Sulfasalazine administration due to the potential for falsely depressed results  • ALT 09/02/2022 12  12 - 78 U/L Final    Specimen collection should occur prior to Sulfasalazine administration due to the potential for falsely depressed results  • Alkaline Phosphatase 09/02/2022 95  46 - 116 U/L Final   • Total Protein 09/02/2022 7 8  6 4 - 8 4 g/dL Final   • Albumin 09/02/2022 3 5  3 5 - 5 0 g/dL Final   • Total Bilirubin 09/02/2022 0 24  0 20 - 1 00 mg/dL Final    Use of this assay is not recommended for patients undergoing treatment with eltrombopag due to the potential for falsely elevated results     • eGFR 09/02/2022 84  ml/min/1 73sq m Final   • Cholesterol 09/02/2022 211 (H)  See Comment mg/dL Final    Cholesterol:         Pediatric <18 Years        Desirable          <170 mg/dL      Borderline High    170-199 mg/dL      High               >=200 mg/dL        Adult >=18 Years            Desirable         <200 mg/dL      Borderline High   200-239 mg/dL High              >239 mg/dL     • Triglycerides 09/02/2022 90  See Comment mg/dL Final    Triglyceride:     0-9Y            <75mg/dL     10Y-17Y         <90 mg/dL       >=18Y     Normal          <150 mg/dL     Borderline High 150-199 mg/dL     High            200-499 mg/dL        Very High       >499 mg/dL    Specimen collection should occur prior to N-Acetylcysteine or Metamizole administration due to the potential for falsely depressed results  • HDL, Direct 09/02/2022 55  >=50 mg/dL Final    Specimen collection should occur prior to Metamizole administration due to the potential for falsley depressed results  • LDL Calculated 09/02/2022 138 (H)  0 - 100 mg/dL Final    LDL Cholesterol:     Optimal           <100 mg/dl     Near Optimal      100-129 mg/dl     Above Optimal       Borderline High 130-159 mg/dl       High            160-189 mg/dl       Very High       >189 mg/dl         This screening LDL is a calculated result  It does not have the accuracy of the Direct Measured LDL in the monitoring of patients with hyperlipidemia and/or statin therapy  Direct Measure LDL (ODI262) must be ordered separately in these patients     • Non-HDL-Chol (CHOL-HDL) 09/02/2022 156  mg/dl Final   Office Visit on 07/29/2022   Component Date Value Ref Range Status   • POCT SARS-CoV-2 Ag 07/29/2022 Negative  Negative Final   • VALID CONTROL 07/29/2022 Valid   Final   Annual Exam on 04/06/2022   Component Date Value Ref Range Status   • Case Report 04/06/2022    Final                    Value:Gynecologic Cytology Report                       Case: [id number]                                  Authorizing Provider:  CHARBEL Benavidez    Collected:           04/06/2022 1634              Ordering Location:     [facility] Received:            04/06/2022 [id number] [facility]                                     OB/GYN Hailey Schaefer Screen:          Ghislaine Roberts MERCEDES Staley                                                    Specimen:    LIQUID-BASED PAP, SCREENING, Cervix                                                       • Primary Interpretation 04/06/2022 Negative for intraepithelial lesion or malignancy   Final   • Specimen Adequacy 04/06/2022 Satisfactory for evaluation  Endocervical/transformation zone component present  Final   • Additional Information 04/06/2022    Final                    Value: This result contains rich text formatting which cannot be displayed here  • LMP 04/06/2022 1/1/2022   Final   • HPV Other HR 04/06/2022 Negative  Negative Final    HPV types: 23,28,07,89,73,60,89,28,85,27,81 and 68 DNA are undetectable or below the pre-set threshold     • HPV16 04/06/2022 Negative  Negative Final   • HPV18 04/06/2022 Negative  Negative Final

## 2023-03-25 DIAGNOSIS — F32.9 REACTIVE DEPRESSION: ICD-10-CM

## 2023-03-27 RX ORDER — BUPROPION HYDROCHLORIDE 150 MG/1
150 TABLET ORAL EVERY MORNING
Qty: 30 TABLET | Refills: 5 | Status: SHIPPED | OUTPATIENT
Start: 2023-03-27

## 2023-06-08 ENCOUNTER — OFFICE VISIT (OUTPATIENT)
Dept: INTERNAL MEDICINE CLINIC | Age: 33
End: 2023-06-08
Payer: COMMERCIAL

## 2023-06-08 VITALS
SYSTOLIC BLOOD PRESSURE: 118 MMHG | BODY MASS INDEX: 36.15 KG/M2 | HEART RATE: 90 BPM | HEIGHT: 65 IN | DIASTOLIC BLOOD PRESSURE: 70 MMHG | OXYGEN SATURATION: 98 % | TEMPERATURE: 98 F | WEIGHT: 217 LBS

## 2023-06-08 DIAGNOSIS — F41.0 PANIC ATTACKS: ICD-10-CM

## 2023-06-08 DIAGNOSIS — F41.1 GENERALIZED ANXIETY DISORDER: ICD-10-CM

## 2023-06-08 DIAGNOSIS — R07.89 OTHER CHEST PAIN: ICD-10-CM

## 2023-06-08 DIAGNOSIS — G89.29 CHRONIC PAIN OF RIGHT KNEE: ICD-10-CM

## 2023-06-08 DIAGNOSIS — M79.672 LEFT FOOT PAIN: ICD-10-CM

## 2023-06-08 DIAGNOSIS — F32.9 REACTIVE DEPRESSION: Primary | ICD-10-CM

## 2023-06-08 DIAGNOSIS — M25.561 CHRONIC PAIN OF RIGHT KNEE: ICD-10-CM

## 2023-06-08 PROCEDURE — 99215 OFFICE O/P EST HI 40 MIN: CPT | Performed by: INTERNAL MEDICINE

## 2023-06-08 RX ORDER — BUPROPION HYDROCHLORIDE 150 MG/1
300 TABLET ORAL EVERY MORNING
Qty: 30 TABLET | Refills: 5
Start: 2023-06-08

## 2023-06-08 RX ORDER — HYDROXYZINE HYDROCHLORIDE 25 MG/1
25 TABLET, FILM COATED ORAL
Qty: 60 TABLET | Refills: 1 | Status: SHIPPED | OUTPATIENT
Start: 2023-06-08

## 2023-06-08 NOTE — PROGRESS NOTES
Assessment/Plan:    Reactive depression  -Not optimally controlled at current dose of bupropion  -We will increase the dose of bupropion from 150 mg daily to 300 mg daily  -Patient was counseled that changing her job would likely help her mood  Generalized anxiety disorder with panic attacks  -Worsening secondary to stressors at work  -We will start patient on as needed hydroxyzine 25 mg at nighttime since her symptoms are worse at night  -She was counseled to monitor herself for side effects of daytime sleepiness  -She was counseled to change her job as soon as she can  Chronic right knee pain  -Both x-ray and MRI of the right knee have been negative for pathology  -We will refer patient to physical therapy    Chronic left foot pain  -With negative foot x-ray  -We will refer patient to physical therapy    Intermittent chest pain  -Currently resolved  -May be related to anxiety and panic attacks  -We will try to control her anxiety  -Follow-up if symptoms persist or worsen    BMI 36 11  -Patient will like to be prescribed a weight loss medication and was interested in [address]  -I discussed with her that [address] is for diabetes  -She was counseled that there are other medications approved for weight loss such as [facility] but that she should confirm that she does not have any family history of medullary thyroid carcinoma before she can get the prescription  Diagnoses and all orders for this visit:    Reactive depression  -     buPROPion (Wellbutrin XL) 150 mg 24 hr tablet; Take 2 tablets (300 mg total) by mouth every morning    Panic attacks  -     hydrOXYzine HCL (ATARAX) 25 mg tablet; Take 1 tablet (25 mg total) by mouth daily at bedtime as needed for anxiety (insomnia)    Left foot pain  -     Ambulatory Referral to Physical Therapy; Future    Chronic pain of right knee  -     Ambulatory Referral to Physical Therapy; Future    Generalized anxiety disorder  -     hydrOXYzine HCL (ATARAX) 25 mg tablet;  Take 1 tablet (25 mg total) by mouth daily at bedtime as needed for anxiety (insomnia)    Other chest pain    BMI 36 0-36 9,adult           Subjective:      Patient ID: Debra Sandoval is a 34 y o  female  HPI  Patient presents with complaints that she has been having anxiety that has worsened  for the past 2 months  Does not think her depression is improved on the current dose of welburtin and would like to increase the dose  She states that her anxiety is related to her job and is mostly at night when she is trying to sleep  She finds that her heart is racing and she cannot fall asleep and is afraid to go to work  Last panic attack was this week and she states that she sometimes wakes up in a panic attack  She works 12 hours a day and has an hour commute and is able to sleep for only 5 hours  She complains that she still has R knee pain that occurs when she is walking and has left foot pain  She states that the pain usually occurs when she is walking and not when she is at rest   Of note, she has had an x-ray and MRI of the right knee that were totally normal   X-ray of the left foot was also normal   Also complains of chest pain that is intermittent as well as occasional chest tightness  Chest pain is nonexertional and is currently resolved     The following portions of the patient's history were reviewed and updated as appropriate:   She  has a past medical history of Depression, Disease of thyroid gland, Hyperlipidemia, Hypothyroidism, and Polycystic ovary syndrome    She   Patient Active Problem List    Diagnosis Date Noted   • Other chest pain 06/08/2023   • Chronic pain of right knee 12/15/2022   • Left foot pain 12/15/2022   • Transient elevated blood pressure 12/15/2022   • Gastroesophageal reflux disease without esophagitis 06/16/2021   • PCOS (polycystic ovarian syndrome) 11/19/2020   • Tinea pedis 11/19/2020   • Gynecologic exam normal 11/19/2020   • Need for influenza vaccination 11/05/2020   • Influenza vaccination declined by patient 11/05/2020   • Other hyperlipidemia 07/29/2020   • Annual physical exam 07/15/2020   • BMI 36 0-36 9,adult 07/15/2020   • Reactive depression 07/15/2020   • Generalized anxiety disorder 07/15/2020   • Panic attacks 07/15/2020   • Endometrial polyp 11/12/2019   • Menorrhagia with irregular cycle 07/26/2019   • Irregular menses 07/26/2019   • Screening examination for STD (sexually transmitted disease) 07/26/2019   • Dyspareunia in female 07/26/2019   • Subclinical hypothyroidism 05/25/2018     She  has a past surgical history that includes [place] tooth extraction and pr hysteroscopy bx endometrium&/polypc w/wo d&c (N/A, 11/19/2019)  Her family history includes Alcohol abuse in her maternal grandfather; Arthritis in her maternal grandmother; Breast cancer in her maternal grandmother; Breast cancer additional onset in her maternal grandmother; Cancer in her maternal grandmother; Dementia in her paternal grandmother; Diabetes in her father; Hearing loss in her maternal grandmother; Hypertension in her mother; No Known Problems in her brother, paternal grandfather, and sister; Rheum arthritis in her maternal aunt; Thyroid disease in her maternal grandmother  She  reports that she has never smoked  She has never used smokeless tobacco  She reports current alcohol use  She reports that she does not use drugs  Current Outpatient Medications   Medication Sig Dispense Refill   • buPROPion (Wellbutrin XL) 150 mg 24 hr tablet Take 2 tablets (300 mg total) by mouth every morning 30 tablet 5   • hydrOXYzine HCL (ATARAX) 25 mg tablet Take 1 tablet (25 mg total) by mouth daily at bedtime as needed for anxiety (insomnia) 60 tablet 1   • norethindrone-ethinyl estradiol (MICROGESTIN 1/20) 1-20 MG-MCG per tablet Take 1 tablet by mouth daily for 28 days 84 tablet 1     No current facility-administered medications for this visit       Current Outpatient Medications on File Prior to Visit "  Medication Sig   • norethindrone-ethinyl estradiol (MICROGESTIN 1/20) 1-20 MG-MCG per tablet Take 1 tablet by mouth daily for 28 days   • [DISCONTINUED] buPROPion (Wellbutrin XL) 150 mg 24 hr tablet Take 1 tablet (150 mg total) by mouth every morning   • [DISCONTINUED] meloxicam (Mobic) 7 5 mg tablet Take 1 tablet (7 5 mg total) by mouth daily (Patient not taking: Reported on 1/25/2023)     No current facility-administered medications on file prior to visit  She has No Known Allergies       Review of Systems   Constitutional: Negative for activity change, chills, fatigue, fever and unexpected weight change  HENT: Negative for ear pain, postnasal drip, rhinorrhea, sinus pressure and sore throat  Eyes: Negative for pain  Respiratory: Negative for cough, choking, chest tightness, shortness of breath and wheezing  Cardiovascular: Negative for chest pain (occasional chest pain, not present at this time ), palpitations and leg swelling  Gastrointestinal: Negative for abdominal pain, constipation, diarrhea, nausea and vomiting  Genitourinary: Negative for dysuria and hematuria  Musculoskeletal: Positive for arthralgias  Negative for back pain, gait problem, joint swelling, myalgias and neck stiffness  Skin: Negative for pallor and rash  Neurological: Positive for headaches (occasional headaches )  Negative for dizziness, tremors, seizures, syncope and light-headedness  Hematological: Negative for adenopathy  Psychiatric/Behavioral: Positive for dysphoric mood and sleep disturbance  Negative for behavioral problems  The patient is nervous/anxious  Objective:      /70 (BP Location: Left arm, Patient Position: Sitting, Cuff Size: Large)   Pulse 90   Temp 98 °F (36 7 °C) (Temporal)   Ht 5' 5\" (1 651 m)   Wt 98 4 kg (217 lb)   SpO2 98%   BMI 36 11 kg/m²          Physical Exam  Constitutional:       General: She is not in acute distress  Appearance: She is well-developed   She " is not diaphoretic  HENT:      Head: Normocephalic and atraumatic  Right Ear: External ear normal       Left Ear: External ear normal       Nose: Nose normal       Mouth/Throat:      Mouth: Mucous membranes are dry  Pharynx: Posterior oropharyngeal erythema (mild oropharyngeal erythema with Mallampati class 4 oropharynx) present  No oropharyngeal exudate  Eyes:      General: No scleral icterus  Right eye: No discharge  Left eye: No discharge  Conjunctiva/sclera: Conjunctivae normal       Pupils: Pupils are equal, round, and reactive to light  Neck:      Thyroid: No thyromegaly  Vascular: No JVD  Trachea: No tracheal deviation  Cardiovascular:      Rate and Rhythm: Normal rate and regular rhythm  Heart sounds: Normal heart sounds  No murmur heard  No friction rub  No gallop  Pulmonary:      Effort: Pulmonary effort is normal  No respiratory distress  Breath sounds: Normal breath sounds  No wheezing or rales  Chest:      Chest wall: No tenderness  Abdominal:      General: Bowel sounds are normal  There is no distension  Palpations: Abdomen is soft  There is no mass  Tenderness: There is no abdominal tenderness  There is no guarding or rebound  Musculoskeletal:         General: No tenderness or deformity  Normal range of motion  Cervical back: Normal range of motion and neck supple  Right knee: No tenderness (No tenderness on palpation)  Left foot: No tenderness (No tenderness on palpation)  Lymphadenopathy:      Cervical: No cervical adenopathy  Skin:     General: Skin is warm and dry  Coloration: Skin is not pale  Findings: No erythema or rash  Neurological:      Mental Status: She is alert and oriented to person, place, and time  Cranial Nerves: No cranial nerve deficit  Motor: No abnormal muscle tone  Coordination: Coordination normal       Gait: Gait is intact   Gait normal       Deep Tendon Reflexes: Reflexes are normal and symmetric  Psychiatric:         Mood and Affect: Mood is anxious (Anxious affect) and depressed (Depressed affect)           Behavior: Behavior normal            Appointment on 09/02/2022   Component Date Value Ref Range Status   • WBC 09/02/2022 5 79  4 31 - 10 16 Thousand/uL Final   • RBC 09/02/2022 4 32  3 81 - 5 12 Million/uL Final   • Hemoglobin 09/02/2022 12 6  11 5 - 15 4 g/dL Final   • Hematocrit 09/02/2022 39 5  34 8 - 46 1 % Final   • MCV 09/02/2022 91  82 - 98 fL Final   • MCH 09/02/2022 29 2  26 8 - 34 3 pg Final   • MCHC 09/02/2022 31 9  31 4 - 37 4 g/dL Final   • RDW 09/02/2022 13 5  11 6 - 15 1 % Final   • MPV 09/02/2022 9 5  8 9 - 12 7 fL Final   • Platelets 19/71/1525 399 (H)  149 - 390 Thousands/uL Final   • nRBC 09/02/2022 0  /100 WBCs Final   • Neutrophils Relative 09/02/2022 42 (L)  43 - 75 % Final   • Immat GRANS % 09/02/2022 0  0 - 2 % Final   • Lymphocytes Relative 09/02/2022 43  14 - 44 % Final   • Monocytes Relative 09/02/2022 10  4 - 12 % Final   • Eosinophils Relative 09/02/2022 4  0 - 6 % Final   • Basophils Relative 09/02/2022 1  0 - 1 % Final   • Neutrophils Absolute 09/02/2022 2 47  1 85 - 7 62 Thousands/µL Final   • Immature Grans Absolute 09/02/2022 0 01  0 00 - 0 20 Thousand/uL Final   • Lymphocytes Absolute 09/02/2022 2 48  0 60 - 4 47 Thousands/µL Final   • Monocytes Absolute 09/02/2022 0 59  0 17 - 1 22 Thousand/µL Final   • Eosinophils Absolute 09/02/2022 0 20  0 00 - 0 61 Thousand/µL Final   • Basophils Absolute 09/02/2022 0 04  0 00 - 0 10 Thousands/µL Final   • TSH 3RD GENERATON 09/02/2022 3 179  0 450 - 4 500 uIU/mL Final    The recommended reference ranges for TSH during pregnancy are as follows:   First trimester 0 1 to 2 5 uIU/mL   Second trimester  0 2 to 3 0 uIU/mL   Third trimester 0 3 to 3 0 uIU/m    Note: Normal ranges may not apply to patients who are transgender, non-binary, or whose legal sex, sex at birth, and gender identity differ  Adult TSH (3rd generation) reference range follows the recommended guidelines of the American Thyroid Association, January, 2020  • Sodium 09/02/2022 140  135 - 147 mmol/L Final   • Potassium 09/02/2022 3 6  3 5 - 5 3 mmol/L Final   • Chloride 09/02/2022 104  96 - 108 mmol/L Final   • CO2 09/02/2022 28  21 - 32 mmol/L Final   • ANION GAP 09/02/2022 8  4 - 13 mmol/L Final   • BUN 09/02/2022 14  5 - 25 mg/dL Final   • Creatinine 09/02/2022 0 91  0 60 - 1 30 mg/dL Final    Standardized to IDMS reference method   • Glucose, Fasting 09/02/2022 94  65 - 99 mg/dL Final    Specimen collection should occur prior to Sulfasalazine administration due to the potential for falsely depressed results  Specimen collection should occur prior to Sulfapyridine administration due to the potential for falsely elevated results  • Calcium 09/02/2022 8 6  8 3 - 10 1 mg/dL Final   • AST 09/02/2022 13  5 - 45 U/L Final    Specimen collection should occur prior to Sulfasalazine administration due to the potential for falsely depressed results  • ALT 09/02/2022 12  12 - 78 U/L Final    Specimen collection should occur prior to Sulfasalazine administration due to the potential for falsely depressed results  • Alkaline Phosphatase 09/02/2022 95  46 - 116 U/L Final   • Total Protein 09/02/2022 7 8  6 4 - 8 4 g/dL Final   • Albumin 09/02/2022 3 5  3 5 - 5 0 g/dL Final   • Total Bilirubin 09/02/2022 0 24  0 20 - 1 00 mg/dL Final    Use of this assay is not recommended for patients undergoing treatment with eltrombopag due to the potential for falsely elevated results     • eGFR 09/02/2022 84  ml/min/1 73sq m Final   • Cholesterol 09/02/2022 211 (H)  See Comment mg/dL Final    Cholesterol:         Pediatric <18 Years        Desirable          <170 mg/dL      Borderline High    170-199 mg/dL      High               >=200 mg/dL        Adult >=18 Years            Desirable         <200 mg/dL      Borderline High   200-239 mg/dL High              >239 mg/dL     • Triglycerides 09/02/2022 90  See Comment mg/dL Final    Triglyceride:     0-9Y            <75mg/dL     10Y-17Y         <90 mg/dL       >=18Y     Normal          <150 mg/dL     Borderline High 150-199 mg/dL     High            200-499 mg/dL        Very High       >499 mg/dL    Specimen collection should occur prior to N-Acetylcysteine or Metamizole administration due to the potential for falsely depressed results  • HDL, Direct 09/02/2022 55  >=50 mg/dL Final    Specimen collection should occur prior to Metamizole administration due to the potential for falsley depressed results  • LDL Calculated 09/02/2022 138 (H)  0 - 100 mg/dL Final    LDL Cholesterol:     Optimal           <100 mg/dl     Near Optimal      100-129 mg/dl     Above Optimal       Borderline High 130-159 mg/dl       High            160-189 mg/dl       Very High       >189 mg/dl         This screening LDL is a calculated result  It does not have the accuracy of the Direct Measured LDL in the monitoring of patients with hyperlipidemia and/or statin therapy  Direct Measure LDL (UHE231) must be ordered separately in these patients     • Non-HDL-Chol (CHOL-HDL) 09/02/2022 156  mg/dl Final   Office Visit on 07/29/2022   Component Date Value Ref Range Status   • POCT SARS-CoV-2 Ag 07/29/2022 Negative  Negative Final   • VALID CONTROL 07/29/2022 Valid   Final

## 2023-06-12 DIAGNOSIS — N91.2 AMENORRHEA: ICD-10-CM

## 2023-06-12 DIAGNOSIS — E28.2 PCOS (POLYCYSTIC OVARIAN SYNDROME): ICD-10-CM

## 2023-06-12 DIAGNOSIS — N92.6 IRREGULAR MENSES: ICD-10-CM

## 2023-06-12 RX ORDER — NORETHINDRONE ACETATE AND ETHINYL ESTRADIOL 1; .02 MG/1; MG/1
1 TABLET ORAL DAILY
Qty: 84 TABLET | Refills: 0 | Status: SHIPPED | OUTPATIENT
Start: 2023-06-12 | End: 2023-07-10

## 2023-06-23 DIAGNOSIS — F32.9 REACTIVE DEPRESSION: ICD-10-CM

## 2023-06-23 RX ORDER — BUPROPION HYDROCHLORIDE 150 MG/1
300 TABLET ORAL EVERY MORNING
Qty: 60 TABLET | Refills: 5 | Status: SHIPPED | OUTPATIENT
Start: 2023-06-23

## 2023-07-12 ENCOUNTER — OFFICE VISIT (OUTPATIENT)
Dept: INTERNAL MEDICINE CLINIC | Age: 33
End: 2023-07-12
Payer: COMMERCIAL

## 2023-07-12 VITALS
HEART RATE: 88 BPM | TEMPERATURE: 98.1 F | BODY MASS INDEX: 35.82 KG/M2 | OXYGEN SATURATION: 97 % | HEIGHT: 65 IN | WEIGHT: 215 LBS | SYSTOLIC BLOOD PRESSURE: 130 MMHG | DIASTOLIC BLOOD PRESSURE: 64 MMHG

## 2023-07-12 DIAGNOSIS — F41.1 GENERALIZED ANXIETY DISORDER: ICD-10-CM

## 2023-07-12 DIAGNOSIS — R45.851 SUICIDAL IDEATION: ICD-10-CM

## 2023-07-12 DIAGNOSIS — F32.9 REACTIVE DEPRESSION: ICD-10-CM

## 2023-07-12 DIAGNOSIS — Z00.00 ANNUAL PHYSICAL EXAM: Primary | ICD-10-CM

## 2023-07-12 PROCEDURE — 99214 OFFICE O/P EST MOD 30 MIN: CPT | Performed by: INTERNAL MEDICINE

## 2023-07-12 PROCEDURE — 99395 PREV VISIT EST AGE 18-39: CPT | Performed by: INTERNAL MEDICINE

## 2023-07-12 NOTE — PROGRESS NOTES
Assessment/Plan:    Annual physical examination  - History and physical examination done  - Pt was counseled to eat a heart healthy diet, to drink at least 2 L of water daily, to take a daily multivitamin and to exercise for at least 30 minutes of cardio exercise daily, for at least 5 days a week. - CBC, CMP, TSH and lipid panel were done within the past 10 months and results were reviewed with patient and all questions answered.  -She is up-to-date with 2 COVID vaccines and her Tdap  -She is up-to-date with her Pap smear  - follow up in 1 month       Diagnoses and all orders for this visit:    Annual physical exam    Generalized anxiety disorder  -     Ambulatory Referral to Psychology; Future    Reactive depression  -     Ambulatory Referral to Psychology; Future    Suicidal ideation          Subjective:      Patient ID: Nunu Baker is a 34 y.o. female. HPI      Patient presents for an annual physical exam.    Last annual physical exam- 22    Past medical history- endometrial polyp, hyperlipidemia, seasonal allergies, PCOS, HLD, anxiety and depression    Past surgical history-D and C for endometrial polyps, wisdom teeth extraction    Medications- see list    Allergies- NKDA    Diet- balanced mostly and occasionally junk, drinks about 40 oz of water daily     Exercise- none    Alcohol use- has been drinking more with an average of a whole bottle or 4-5 drinks, not everyday    Caffeine and soda use- tea occasionally     Nicotine use- never    Recreational drug use- marijuana - edible     Work-    Sexual history, STD history and HIV testing-monogamous with male partner, STD hx - never, HIV testing - done and negative    Gynecological history/Prostate health/testicular health history- LMP - ?   with a hx of irregular periods and is on ocp and does not take the blanks and skips her periods, last pap smear - 2022,     Colonoscopy- N/A    Immunization history-up to date with the covid shot x 2 and tdap    Dental visit- every 6 months    Vision-myopia - glasses    Family history-  MGM - breast cancer,   fibrocystic ds of the breast - mom,   htn - mom  DM - dad,   thyroid ds - MGM  hld - mom    Today, patient also presents for a follow-up visit regarding her anxiety, depression. She states that she has been taking her medications as prescribed. Today pt states that she had a whole melt down at work and had to be sent home 2 days ago. She states that she was getting in trouble and going late cos she is not getting enough sleep and has been finding it difficult to get up in the am   She has been afraid to take the atarax and would go into work anxious. This is the second time it has happened at this time she states that she was in a meeting with her manager and she was crying hysterically and had to be sent home and felt that the job will kill her and cause emotional stress for her and she was told to try and get short term disability. She states that her anxiety has not improved even with the change in medication-bupropion. Last time she was at work was 2 days ago - incident occurred on Monday   Has been at the job for about 4 years  Feels like her anxiety has worsened in the past 1-2 weeks   Has been coming home and lying down and crying and drinking more and did not want to be alive   She has not yet started physical therapy  for her knee pain  She admits to occasional headaches, occasional diarrhea,, knee pain on the right and left-sided ankle pain but denies fever, chills, night sweats, nasal congestion, rhinorrhea, earache, sinus pain or pressure, sore throat, cough, chest pain, shortness of breath, palpitations, nausea, vomiting, abdominal pain, constipation, hematochezia, hematuria.     The following portions of the patient's history were reviewed and updated as appropriate:   She  has a past medical history of Depression, Disease of thyroid gland, Hyperlipidemia, Hypothyroidism, and Polycystic ovary syndrome. She   Patient Active Problem List    Diagnosis Date Noted   • Other chest pain 06/08/2023   • Chronic pain of right knee 12/15/2022   • Left foot pain 12/15/2022   • Transient elevated blood pressure 12/15/2022   • Gastroesophageal reflux disease without esophagitis 06/16/2021   • PCOS (polycystic ovarian syndrome) 11/19/2020   • Tinea pedis 11/19/2020   • Gynecologic exam normal 11/19/2020   • Need for influenza vaccination 11/05/2020   • Influenza vaccination declined by patient 11/05/2020   • Other hyperlipidemia 07/29/2020   • Annual physical exam 07/15/2020   • BMI 36.0-36.9,adult 07/15/2020   • Reactive depression 07/15/2020   • Generalized anxiety disorder 07/15/2020   • Panic attacks 07/15/2020   • Endometrial polyp 11/12/2019   • Menorrhagia with irregular cycle 07/26/2019   • Irregular menses 07/26/2019   • Screening examination for STD (sexually transmitted disease) 07/26/2019   • Dyspareunia in female 07/26/2019   • Subclinical hypothyroidism 05/25/2018     She  has a past surgical history that includes [place] tooth extraction and pr hysteroscopy bx endometrium&/polypc w/wo d&c (N/A, 11/19/2019). Her family history includes Alcohol abuse in her maternal grandfather; Arthritis in her maternal grandmother; Breast cancer in her maternal grandmother; Breast cancer additional onset in her maternal grandmother; Cancer in her maternal grandmother; Dementia in her paternal grandmother; Diabetes in her father; Hearing loss in her maternal grandmother; Hypertension in her mother; No Known Problems in her brother, paternal grandfather, and sister; Rheum arthritis in her maternal aunt; Thyroid disease in her maternal grandmother. She  reports that she has never smoked. She has never used smokeless tobacco. She reports current alcohol use. She reports that she does not use drugs.   Current Outpatient Medications   Medication Sig Dispense Refill   • buPROPion (Wellbutrin XL) 150 mg 24 hr tablet Take 2 tablets (300 mg total) by mouth every morning 60 tablet 5   • hydrOXYzine HCL (ATARAX) 25 mg tablet Take 1 tablet (25 mg total) by mouth daily at bedtime as needed for anxiety (insomnia) 60 tablet 1   • norethindrone-ethinyl estradiol (MICROGESTIN 1/20) 1-20 MG-MCG per tablet Take 1 tablet by mouth daily for 28 days 84 tablet 0     No current facility-administered medications for this visit. Current Outpatient Medications on File Prior to Visit   Medication Sig   • buPROPion (Wellbutrin XL) 150 mg 24 hr tablet Take 2 tablets (300 mg total) by mouth every morning   • hydrOXYzine HCL (ATARAX) 25 mg tablet Take 1 tablet (25 mg total) by mouth daily at bedtime as needed for anxiety (insomnia)   • norethindrone-ethinyl estradiol (MICROGESTIN 1/20) 1-20 MG-MCG per tablet Take 1 tablet by mouth daily for 28 days     No current facility-administered medications on file prior to visit. She has No Known Allergies. .    Review of Systems   Constitutional: Negative for activity change, chills, fatigue, fever and unexpected weight change. HENT: Negative for ear pain, postnasal drip, rhinorrhea, sinus pressure and sore throat. Eyes: Negative for pain. Respiratory: Negative for cough, choking, chest tightness, shortness of breath and wheezing. Cardiovascular: Negative for chest pain, palpitations and leg swelling. Gastrointestinal: Positive for diarrhea (occasionally ). Negative for abdominal pain, constipation, nausea and vomiting. Genitourinary: Negative for dysuria, hematuria and menstrual problem. Musculoskeletal: Positive for arthralgias (R knee pain and left foot pain ). Negative for back pain, gait problem, joint swelling, myalgias and neck stiffness. Skin: Negative for pallor and rash. Neurological: Positive for headaches (on and off ). Negative for dizziness, tremors, seizures, syncope and light-headedness. Hematological: Negative for adenopathy. Psychiatric/Behavioral: Positive for dysphoric mood, sleep disturbance and suicidal ideas (Suicidal ideation without a plan). Negative for behavioral problems. The patient is nervous/anxious. Objective:      /64 (BP Location: Left arm, Patient Position: Sitting, Cuff Size: Large)   Pulse 88   Temp 98.1 °F (36.7 °C) (Temporal)   Ht 5' 5" (1.651 m)   Wt 97.5 kg (215 lb)   SpO2 97%   BMI 35.78 kg/m²          Physical Exam  Constitutional:       General: She is not in acute distress. Appearance: She is well-developed. She is not diaphoretic. HENT:      Head: Normocephalic and atraumatic. Right Ear: External ear normal. Tympanic membrane is injected. Left Ear: External ear normal. Tympanic membrane is injected (with erythema of the Ex aud canal b/l). Nose: Mucosal edema present. No congestion. Mouth/Throat:      Pharynx: Posterior oropharyngeal erythema (oropharyngeal erythema with dry mucous  membranse and Mallampati class 4 oropharynx) present. No oropharyngeal exudate. Eyes:      General: No scleral icterus. Right eye: No discharge. Left eye: No discharge. Conjunctiva/sclera: Conjunctivae normal.      Pupils: Pupils are equal, round, and reactive to light. Neck:      Thyroid: No thyromegaly. Vascular: No JVD. Trachea: No tracheal deviation. Cardiovascular:      Rate and Rhythm: Normal rate and regular rhythm. Heart sounds: Normal heart sounds. No murmur heard. No friction rub. No gallop. Pulmonary:      Effort: Pulmonary effort is normal. No respiratory distress. Breath sounds: Normal breath sounds. No wheezing or rales. Chest:      Chest wall: No tenderness. Abdominal:      General: Bowel sounds are normal. There is no distension. Palpations: Abdomen is soft. There is no mass. Tenderness: There is no abdominal tenderness. There is no guarding or rebound.    Musculoskeletal:         General: No tenderness or deformity. Normal range of motion. Cervical back: Normal range of motion and neck supple. Lymphadenopathy:      Cervical: No cervical adenopathy. Skin:     General: Skin is warm and dry. Coloration: Skin is not pale. Findings: No erythema or rash. Neurological:      Mental Status: She is alert and oriented to person, place, and time. Cranial Nerves: No cranial nerve deficit. Motor: No abnormal muscle tone. Coordination: Coordination normal.      Deep Tendon Reflexes: Reflexes are normal and symmetric. Comments: Cranial nerves 2-12 are intact bilaterally  Muscle strength is 5/5 in all extremities  Sensation is intact in bilateral face and extremities  Rapid alternating movement and finger-to-nose pointing test intact   Deep tendon reflexes are 2+ bilaterally  Gait is antalgic 2/2 to ankle and knee pain        Psychiatric:         Mood and Affect: Mood is anxious and depressed.          Behavior: Behavior normal.           Appointment on 09/02/2022   Component Date Value Ref Range Status   • WBC 09/02/2022 5.79  4.31 - 10.16 Thousand/uL Final   • RBC 09/02/2022 4.32  3.81 - 5.12 Million/uL Final   • Hemoglobin 09/02/2022 12.6  11.5 - 15.4 g/dL Final   • Hematocrit 09/02/2022 39.5  34.8 - 46.1 % Final   • MCV 09/02/2022 91  82 - 98 fL Final   • MCH 09/02/2022 29.2  26.8 - 34.3 pg Final   • MCHC 09/02/2022 31.9  31.4 - 37.4 g/dL Final   • RDW 09/02/2022 13.5  11.6 - 15.1 % Final   • MPV 09/02/2022 9.5  8.9 - 12.7 fL Final   • Platelets 80/27/5243 399 (H)  149 - 390 Thousands/uL Final   • nRBC 09/02/2022 0  /100 WBCs Final   • Neutrophils Relative 09/02/2022 42 (L)  43 - 75 % Final   • Immat GRANS % 09/02/2022 0  0 - 2 % Final   • Lymphocytes Relative 09/02/2022 43  14 - 44 % Final   • Monocytes Relative 09/02/2022 10  4 - 12 % Final   • Eosinophils Relative 09/02/2022 4  0 - 6 % Final   • Basophils Relative 09/02/2022 1  0 - 1 % Final   • Neutrophils Absolute 09/02/2022 2.47 1.85 - 7.62 Thousands/µL Final   • Immature Grans Absolute 09/02/2022 0.01  0.00 - 0.20 Thousand/uL Final   • Lymphocytes Absolute 09/02/2022 2.48  0.60 - 4.47 Thousands/µL Final   • Monocytes Absolute 09/02/2022 0.59  0.17 - 1.22 Thousand/µL Final   • Eosinophils Absolute 09/02/2022 0.20  0.00 - 0.61 Thousand/µL Final   • Basophils Absolute 09/02/2022 0.04  0.00 - 0.10 Thousands/µL Final   • TSH 3RD GENERATON 09/02/2022 3.179  0.450 - 4.500 uIU/mL Final    The recommended reference ranges for TSH during pregnancy are as follows:   First trimester 0.1 to 2.5 uIU/mL   Second trimester  0.2 to 3.0 uIU/mL   Third trimester 0.3 to 3.0 uIU/m    Note: Normal ranges may not apply to patients who are transgender, non-binary, or whose legal sex, sex at birth, and gender identity differ. Adult TSH (3rd generation) reference range follows the recommended guidelines of the [employer] Thy[employer] Association, January, 2020. • Sodium 09/02/2022 140  135 - 147 mmol/L Final   • Potassium 09/02/2022 3.6  3.5 - 5.3 mmol/L Final   • Chloride 09/02/2022 104  96 - 108 mmol/L Final   • CO2 09/02/2022 28  21 - 32 mmol/L Final   • ANION GAP 09/02/2022 8  4 - 13 mmol/L Final   • BUN 09/02/2022 14  5 - 25 mg/dL Final   • Creatinine 09/02/2022 0.91  0.60 - 1.30 mg/dL Final    Standardized to IDMS reference method   • Glucose, Fasting 09/02/2022 94  65 - 99 mg/dL Final    Specimen collection should occur prior to Sulfasalazine administration due to the potential for falsely depressed results. Specimen collection should occur prior to Sulfapyridine administration due to the potential for falsely elevated results. • Calcium 09/02/2022 8.6  8.3 - 10.1 mg/dL Final   • AST 09/02/2022 13  5 - 45 U/L Final    Specimen collection should occur prior to Sulfasalazine administration due to the potential for falsely depressed results.     • ALT 09/02/2022 12  12 - 78 U/L Final    Specimen collection should occur prior to Sulfasalazine administration due to the potential for falsely depressed results. • Alkaline Phosphatase 09/02/2022 95  46 - 116 U/L Final   • Total Protein 09/02/2022 7.8  6.4 - 8.4 g/dL Final   • Albumin 09/02/2022 3.5  3.5 - 5.0 g/dL Final   • Total Bilirubin 09/02/2022 0.24  0.20 - 1.00 mg/dL Final    Use of this assay is not recommended for patients undergoing treatment with eltrombopag due to the potential for falsely elevated results. • eGFR 09/02/2022 84  ml/min/1.73sq m Final   • Cholesterol 09/02/2022 211 (H)  See Comment mg/dL Final    Cholesterol:         Pediatric <18 Years        Desirable          <170 mg/dL      Borderline High    170-199 mg/dL      High               >=200 mg/dL        Adult >=18 Years            Desirable         <200 mg/dL      Borderline High   200-239 mg/dL      High              >239 mg/dL     • Triglycerides 09/02/2022 90  See Comment mg/dL Final    Triglyceride:     0-9Y            <75mg/dL     10Y-17Y         <90 mg/dL       >=18Y     Normal          <150 mg/dL     Borderline High 150-199 mg/dL     High            200-499 mg/dL        Very High       >499 mg/dL    Specimen collection should occur prior to N-Acetylcysteine or Metamizole administration due to the potential for falsely depressed results. • HDL, Direct 09/02/2022 55  >=50 mg/dL Final    Specimen collection should occur prior to Metamizole administration due to the potential for falsley depressed results. • LDL Calculated 09/02/2022 138 (H)  0 - 100 mg/dL Final    LDL Cholesterol:     Optimal           <100 mg/dl     Near Optimal      100-129 mg/dl     Above Optimal       Borderline High 130-159 mg/dl       High            160-189 mg/dl       Very High       >189 mg/dl         This screening LDL is a calculated result. It does not have the accuracy of the Direct Measured LDL in the monitoring of patients with hyperlipidemia and/or statin therapy. Direct Measure LDL ([id number]) must be ordered separately in these patients.    • Non-HDL-Chol (CHOL-HDL) 09/02/2022 156  mg/dl Final   Office Visit on 07/29/2022   Component Date Value Ref Range Status   • POCT SARS-CoV-2 Ag 07/29/2022 Negative  Negative Final   • VALID CONTROL 07/29/2022 Valid   Final

## 2023-07-13 NOTE — PROGRESS NOTES
Assessment/Plan:    Generalized anxiety disorder  -Not optimally controlled and patient has not been able to take her hydroxyzine because of her job stresses.  -She had a breakdown at work and was told to seek short-term disability  -I discussed with her that she should reach out to her job's human resources to find out what the process is for applying for short-term disability and we will complete the form for her as requested.  -We will refer patient to psychology because she would benefit from more frequent counseling  -Continue with hydroxyzine as needed    Reactive depression  -Not well controlled especially secondary to her job stressors  -Patient is currently off work and plans to apply for short-term disability.  -She was counseled to find out from her job what the process for getting short-term disability is and we will complete the form for her.  -We will refer patient to psychology for more frequent psychotherapy  -Continue with Wellbutrin at current dose. We will hold off on increasing the dose at this time because I believe that patient's greatest stressor is her job and she is currently not going back to work. -She was counseled to try and get a different job. Suicidal ideation  -With regards to her suicidal ideation, she states that she will not take her own life because she does not want to feel the pain. I counseled patient that since she does not know if the best years of her life in the future. I also counseled her that she should call 911 if she ever decides that she wants to take her own life. Diagnoses and all orders for this visit:    Annual physical exam    Generalized anxiety disorder  -     Ambulatory Referral to Psychology; Future    Reactive depression  -     Ambulatory Referral to Psychology; Future    Suicidal ideation           Subjective:      Patient ID: Tiburcio Severance is a 34 y.o. female.     HPI     Today, patient also presents for a follow-up visit regarding her anxiety, depression. She states that she has been taking her medications as prescribed. Today pt states that she had a whole melt down at work and had to be sent home 2 days ago. She states that she was getting in trouble and going late cos she is not getting enough sleep and has been finding it difficult to get up in the am to make it to work on time. She has been afraid to take the atarax since she does not know how it would affect her in the a.m. and would go into work anxious. This is the second time it has happened and this time she states that she was in a meeting with her manager and she was crying hysterically and had to be sent home and felt that the job will kill her and cause emotional stress for her and she was told to try and get short term disability. She states that her anxiety has not improved even with the change in medication-bupropion. Last time she was at work was 2 days ago - incident occurred on Monday   Has been at the job for about 4 years  Feels like her anxiety has worsened in the past 1-2 weeks   Has been coming home and lying down and crying and drinking more and did not want to be alive. She states that she will take about 4-5 drinks a night or a whole bottle of wine. She states that she has not been able to get a new job. She states that she still goes for psychotherapy and is looking into going for more psychotherapy. She states that she used to go once a week and now is on a once in 2-week schedule. She has not yet started physical therapy  for her knee pain  She admits to occasional headaches, occasional diarrhea,, knee pain on the right and left-sided ankle pain but denies fever, chills, night sweats, nasal congestion, rhinorrhea, earache, sinus pain or pressure, sore throat, cough, chest pain, shortness of breath, palpitations, nausea, vomiting, abdominal pain, constipation, hematochezia, hematuria.       The following portions of the patient's history were reviewed and updated as appropriate:   She  has a past medical history of Depression, Disease of thyroid gland, Hyperlipidemia, Hypothyroidism, and Polycystic ovary syndrome. She   Patient Active Problem List    Diagnosis Date Noted   • Other chest pain 06/08/2023   • Chronic pain of right knee 12/15/2022   • Left foot pain 12/15/2022   • Transient elevated blood pressure 12/15/2022   • Gastroesophageal reflux disease without esophagitis 06/16/2021   • PCOS (polycystic ovarian syndrome) 11/19/2020   • Tinea pedis 11/19/2020   • Gynecologic exam normal 11/19/2020   • Need for influenza vaccination 11/05/2020   • Influenza vaccination declined by patient 11/05/2020   • Other hyperlipidemia 07/29/2020   • Annual physical exam 07/15/2020   • BMI 36.0-36.9,adult 07/15/2020   • Reactive depression 07/15/2020   • Generalized anxiety disorder 07/15/2020   • Panic attacks 07/15/2020   • Endometrial polyp 11/12/2019   • Menorrhagia with irregular cycle 07/26/2019   • Irregular menses 07/26/2019   • Screening examination for STD (sexually transmitted disease) 07/26/2019   • Dyspareunia in female 07/26/2019   • Subclinical hypothyroidism 05/25/2018     She  has a past surgical history that includes [place] tooth extraction and pr hysteroscopy bx endometrium&/polypc w/wo d&c (N/A, 11/19/2019). Her family history includes Alcohol abuse in her maternal grandfather; Arthritis in her maternal grandmother; Breast cancer in her maternal grandmother; Breast cancer additional onset in her maternal grandmother; Cancer in her maternal grandmother; Dementia in her paternal grandmother; Diabetes in her father; Hearing loss in her maternal grandmother; Hypertension in her mother; No Known Problems in her brother, paternal grandfather, and sister; Rheum arthritis in her maternal aunt; Thyroid disease in her maternal grandmother. She  reports that she has never smoked. She has never used smokeless tobacco. She reports current alcohol use.  She reports that she does not use drugs. Current Outpatient Medications   Medication Sig Dispense Refill   • buPROPion (Wellbutrin XL) 150 mg 24 hr tablet Take 2 tablets (300 mg total) by mouth every morning 60 tablet 5   • hydrOXYzine HCL (ATARAX) 25 mg tablet Take 1 tablet (25 mg total) by mouth daily at bedtime as needed for anxiety (insomnia) 60 tablet 1   • norethindrone-ethinyl estradiol (MICROGESTIN 1/20) 1-20 MG-MCG per tablet Take 1 tablet by mouth daily for 28 days 84 tablet 0     No current facility-administered medications for this visit. Current Outpatient Medications on File Prior to Visit   Medication Sig   • buPROPion (Wellbutrin XL) 150 mg 24 hr tablet Take 2 tablets (300 mg total) by mouth every morning   • hydrOXYzine HCL (ATARAX) 25 mg tablet Take 1 tablet (25 mg total) by mouth daily at bedtime as needed for anxiety (insomnia)   • norethindrone-ethinyl estradiol (MICROGESTIN 1/20) 1-20 MG-MCG per tablet Take 1 tablet by mouth daily for 28 days     No current facility-administered medications on file prior to visit. She has No Known Allergies. .    Review of Systems   Constitutional: Negative for activity change, chills, fatigue, fever and unexpected weight change. HENT: Negative for congestion, ear pain, postnasal drip, rhinorrhea, sinus pressure and sore throat. Eyes: Negative for pain. Respiratory: Negative for cough, choking, chest tightness, shortness of breath and wheezing. Cardiovascular: Negative for chest pain, palpitations and leg swelling. Gastrointestinal: Positive for diarrhea. Negative for abdominal pain, constipation, nausea and vomiting. Genitourinary: Negative for dysuria and hematuria. Musculoskeletal: Positive for arthralgias (Right-sided knee pain and left-sided foot pain). Negative for back pain, gait problem, joint swelling, myalgias and neck stiffness. Skin: Negative for pallor and rash. Neurological: Positive for headaches.  Negative for dizziness, tremors, seizures, syncope and light-headedness. Hematological: Negative for adenopathy. Psychiatric/Behavioral: Positive for dysphoric mood, sleep disturbance and suicidal ideas. Negative for behavioral problems. The patient is nervous/anxious. Objective:      /64 (BP Location: Left arm, Patient Position: Sitting, Cuff Size: Large)   Pulse 88   Temp 98.1 °F (36.7 °C) (Temporal)   Ht 5' 5" (1.651 m)   Wt 97.5 kg (215 lb)   SpO2 97%   BMI 35.78 kg/m²          Physical Exam  Constitutional:       General: She is not in acute distress. Appearance: She is well-developed. She is not diaphoretic. HENT:      Head: Normocephalic and atraumatic. Right Ear: External ear normal. Tympanic membrane is injected. Left Ear: External ear normal. Tympanic membrane is injected. Nose: Nose normal.      Mouth/Throat:      Mouth: Mucous membranes are dry. Pharynx: Posterior oropharyngeal erythema (Oropharyngeal erythema with dry mucous membranes and Mallampati class IV oropharynx) present. No oropharyngeal exudate. Eyes:      General: No scleral icterus. Right eye: No discharge. Left eye: No discharge. Conjunctiva/sclera: Conjunctivae normal.      Pupils: Pupils are equal, round, and reactive to light. Neck:      Thyroid: No thyromegaly. Vascular: No JVD. Trachea: No tracheal deviation. Cardiovascular:      Rate and Rhythm: Normal rate and regular rhythm. Heart sounds: Normal heart sounds. No murmur heard. No friction rub. No gallop. Pulmonary:      Effort: Pulmonary effort is normal. No respiratory distress. Breath sounds: Normal breath sounds. No wheezing or rales. Chest:      Chest wall: No tenderness. Abdominal:      General: Bowel sounds are normal. There is no distension. Palpations: Abdomen is soft. There is no mass. Tenderness: There is no abdominal tenderness. There is no guarding or rebound.    Musculoskeletal: General: No tenderness or deformity. Normal range of motion. Cervical back: Normal range of motion and neck supple. Lymphadenopathy:      Cervical: No cervical adenopathy. Skin:     General: Skin is warm and dry. Coloration: Skin is not pale. Findings: No erythema or rash. Neurological:      Mental Status: She is alert and oriented to person, place, and time. Cranial Nerves: No cranial nerve deficit. Motor: No abnormal muscle tone. Coordination: Coordination normal.      Deep Tendon Reflexes: Reflexes are normal and symmetric. Psychiatric:         Mood and Affect: Mood is anxious (Anxious affect) and depressed (Depressed affect).          Behavior: Behavior normal.           Appointment on 09/02/2022   Component Date Value Ref Range Status   • WBC 09/02/2022 5.79  4.31 - 10.16 Thousand/uL Final   • RBC 09/02/2022 4.32  3.81 - 5.12 Million/uL Final   • Hemoglobin 09/02/2022 12.6  11.5 - 15.4 g/dL Final   • Hematocrit 09/02/2022 39.5  34.8 - 46.1 % Final   • MCV 09/02/2022 91  82 - 98 fL Final   • MCH 09/02/2022 29.2  26.8 - 34.3 pg Final   • MCHC 09/02/2022 31.9  31.4 - 37.4 g/dL Final   • RDW 09/02/2022 13.5  11.6 - 15.1 % Final   • MPV 09/02/2022 9.5  8.9 - 12.7 fL Final   • Platelets 48/33/5717 399 (H)  149 - 390 Thousands/uL Final   • nRBC 09/02/2022 0  /100 WBCs Final   • Neutrophils Relative 09/02/2022 42 (L)  43 - 75 % Final   • Immat GRANS % 09/02/2022 0  0 - 2 % Final   • Lymphocytes Relative 09/02/2022 43  14 - 44 % Final   • Monocytes Relative 09/02/2022 10  4 - 12 % Final   • Eosinophils Relative 09/02/2022 4  0 - 6 % Final   • Basophils Relative 09/02/2022 1  0 - 1 % Final   • Neutrophils Absolute 09/02/2022 2.47  1.85 - 7.62 Thousands/µL Final   • Immature Grans Absolute 09/02/2022 0.01  0.00 - 0.20 Thousand/uL Final   • Lymphocytes Absolute 09/02/2022 2.48  0.60 - 4.47 Thousands/µL Final   • Monocytes Absolute 09/02/2022 0.59  0.17 - 1.22 Thousand/µL Final   • Eosinophils Absolute 09/02/2022 0.20  0.00 - 0.61 Thousand/µL Final   • Basophils Absolute 09/02/2022 0.04  0.00 - 0.10 Thousands/µL Final   • TSH 3RD GENERATON 09/02/2022 3.179  0.450 - 4.500 uIU/mL Final    The recommended reference ranges for TSH during pregnancy are as follows:   First trimester 0.1 to 2.5 uIU/mL   Second trimester  0.2 to 3.0 uIU/mL   Third trimester 0.3 to 3.0 uIU/m    Note: Normal ranges may not apply to patients who are transgender, non-binary, or whose legal sex, sex at birth, and gender identity differ. Adult TSH (3rd generation) reference range follows the recommended guidelines of the American Thyroid Association, January, 2020. • Sodium 09/02/2022 140  135 - 147 mmol/L Final   • Potassium 09/02/2022 3.6  3.5 - 5.3 mmol/L Final   • Chloride 09/02/2022 104  96 - 108 mmol/L Final   • CO2 09/02/2022 28  21 - 32 mmol/L Final   • ANION GAP 09/02/2022 8  4 - 13 mmol/L Final   • BUN 09/02/2022 14  5 - 25 mg/dL Final   • Creatinine 09/02/2022 0.91  0.60 - 1.30 mg/dL Final    Standardized to IDMS reference method   • Glucose, Fasting 09/02/2022 94  65 - 99 mg/dL Final    Specimen collection should occur prior to Sulfasalazine administration due to the potential for falsely depressed results. Specimen collection should occur prior to Sulfapyridine administration due to the potential for falsely elevated results. • Calcium 09/02/2022 8.6  8.3 - 10.1 mg/dL Final   • AST 09/02/2022 13  5 - 45 U/L Final    Specimen collection should occur prior to Sulfasalazine administration due to the potential for falsely depressed results. • ALT 09/02/2022 12  12 - 78 U/L Final    Specimen collection should occur prior to Sulfasalazine administration due to the potential for falsely depressed results.     • Alkaline Phosphatase 09/02/2022 95  46 - 116 U/L Final   • Total Protein 09/02/2022 7.8  6.4 - 8.4 g/dL Final   • Albumin 09/02/2022 3.5  3.5 - 5.0 g/dL Final   • Total Bilirubin 09/02/2022 0.24  0.20 - 1.00 mg/dL Final    Use of this assay is not recommended for patients undergoing treatment with eltrombopag due to the potential for falsely elevated results. • eGFR 09/02/2022 84  ml/min/1.73sq m Final   • Cholesterol 09/02/2022 211 (H)  See Comment mg/dL Final    Cholesterol:         Pediatric <18 Years        Desirable          <170 mg/dL      Borderline High    170-199 mg/dL      High               >=200 mg/dL        Adult >=18 Years            Desirable         <200 mg/dL      Borderline High   200-239 mg/dL      High              >239 mg/dL     • Triglycerides 09/02/2022 90  See Comment mg/dL Final    Triglyceride:     0-9Y            <75mg/dL     10Y-17Y         <90 mg/dL       >=18Y     Normal          <150 mg/dL     Borderline High 150-199 mg/dL     High            200-499 mg/dL        Very High       >499 mg/dL    Specimen collection should occur prior to N-Acetylcysteine or Metamizole administration due to the potential for falsely depressed results. • HDL, Direct 09/02/2022 55  >=50 mg/dL Final    Specimen collection should occur prior to Metamizole administration due to the potential for falsley depressed results. • LDL Calculated 09/02/2022 138 (H)  0 - 100 mg/dL Final    LDL Cholesterol:     Optimal           <100 mg/dl     Near Optimal      100-129 mg/dl     Above Optimal       Borderline High 130-159 mg/dl       High            160-189 mg/dl       Very High       >189 mg/dl         This screening LDL is a calculated result. It does not have the accuracy of the Direct Measured LDL in the monitoring of patients with hyperlipidemia and/or statin therapy. Direct Measure LDL (RTN126) must be ordered separately in these patients.    • Non-HDL-Chol (CHOL-HDL) 09/02/2022 156  mg/dl Final   Office Visit on 07/29/2022   Component Date Value Ref Range Status   • POCT SARS-CoV-2 Ag 07/29/2022 Negative  Negative Final   • VALID CONTROL 07/29/2022 Valid   Final

## 2023-07-14 ENCOUNTER — TELEPHONE (OUTPATIENT)
Dept: INTERNAL MEDICINE CLINIC | Age: 33
End: 2023-07-14

## 2023-07-14 NOTE — TELEPHONE ENCOUNTER
Received [facility] papers for patient, scanning into media and giving to Dr Arlene London to complete. Patient is aware that there is a 30$ form fee.

## 2023-07-21 NOTE — TELEPHONE ENCOUNTER
Left message on machine that [facility] paperwork is completed and ready to be picked up. Already scanned into chart and form fee of 30$ on patient's account.

## 2023-07-25 ENCOUNTER — TELEPHONE (OUTPATIENT)
Dept: OTHER | Facility: HOSPITAL | Age: 33
End: 2023-07-25

## 2023-07-25 ENCOUNTER — TELEPHONE (OUTPATIENT)
Dept: INTERNAL MEDICINE CLINIC | Age: 33
End: 2023-07-25

## 2023-07-25 NOTE — TELEPHONE ENCOUNTER
Patient is asking that you please call her when you are able to regarding the [facility] paperwork that you filled out.         [phone]

## 2023-07-26 NOTE — TELEPHONE ENCOUNTER
I called and discussed pt's FMLA form with her. She accepts that all her information may be given to her job via the form and I informed her that they are also requesting her notes and she is okay with that. She does not think that she will be ready to return to work in August and wants a full 8 weeks out and tells me that she is feeling a little better but is also a bit anxious that she may be sent back to work sooner than she believes she will be ready to return. Of note, the form states that she may return to work with reduced hours after one month. I encouraged her to call and make an appointment to start going for increased hours of psychotherapy so that she can be ready to return to work as soon as she can. She wants her return to work date to be in September and I counseled her that when she comes for her follow up visit, we will review her symptoms and if necessary adjust her return to work date. She expressed her understanding and agreement with the plan.

## 2023-08-14 ENCOUNTER — APPOINTMENT (OUTPATIENT)
Dept: LAB | Age: 33
End: 2023-08-14
Payer: COMMERCIAL

## 2023-08-14 ENCOUNTER — OFFICE VISIT (OUTPATIENT)
Dept: INTERNAL MEDICINE CLINIC | Age: 33
End: 2023-08-14
Payer: COMMERCIAL

## 2023-08-14 VITALS
TEMPERATURE: 97.8 F | DIASTOLIC BLOOD PRESSURE: 64 MMHG | HEIGHT: 65 IN | BODY MASS INDEX: 35.82 KG/M2 | HEART RATE: 94 BPM | SYSTOLIC BLOOD PRESSURE: 124 MMHG | OXYGEN SATURATION: 97 % | WEIGHT: 215 LBS

## 2023-08-14 DIAGNOSIS — Z00.00 ANNUAL PHYSICAL EXAM: ICD-10-CM

## 2023-08-14 DIAGNOSIS — F32.9 REACTIVE DEPRESSION: ICD-10-CM

## 2023-08-14 DIAGNOSIS — R45.851 SUICIDAL IDEATION: ICD-10-CM

## 2023-08-14 DIAGNOSIS — R03.0 TRANSIENT ELEVATED BLOOD PRESSURE: ICD-10-CM

## 2023-08-14 DIAGNOSIS — F41.0 PANIC ATTACKS: ICD-10-CM

## 2023-08-14 DIAGNOSIS — M25.561 CHRONIC PAIN OF RIGHT KNEE: ICD-10-CM

## 2023-08-14 DIAGNOSIS — G89.29 CHRONIC PAIN OF RIGHT KNEE: ICD-10-CM

## 2023-08-14 DIAGNOSIS — F41.1 GENERALIZED ANXIETY DISORDER: Primary | ICD-10-CM

## 2023-08-14 DIAGNOSIS — M79.672 LEFT FOOT PAIN: ICD-10-CM

## 2023-08-14 DIAGNOSIS — F41.1 GENERALIZED ANXIETY DISORDER: ICD-10-CM

## 2023-08-14 LAB
ALBUMIN SERPL BCP-MCNC: 3.5 G/DL (ref 3.5–5)
ALP SERPL-CCNC: 78 U/L (ref 46–116)
ALT SERPL W P-5'-P-CCNC: 24 U/L (ref 12–78)
ANION GAP SERPL CALCULATED.3IONS-SCNC: 5 MMOL/L
AST SERPL W P-5'-P-CCNC: 16 U/L (ref 5–45)
BASOPHILS # BLD AUTO: 0.03 THOUSANDS/ÂΜL (ref 0–0.1)
BASOPHILS NFR BLD AUTO: 1 % (ref 0–1)
BILIRUB SERPL-MCNC: 0.41 MG/DL (ref 0.2–1)
BUN SERPL-MCNC: 14 MG/DL (ref 5–25)
CALCIUM SERPL-MCNC: 9.4 MG/DL (ref 8.3–10.1)
CHLORIDE SERPL-SCNC: 107 MMOL/L (ref 96–108)
CHOLEST SERPL-MCNC: 216 MG/DL
CO2 SERPL-SCNC: 24 MMOL/L (ref 21–32)
CREAT SERPL-MCNC: 1.06 MG/DL (ref 0.6–1.3)
EOSINOPHIL # BLD AUTO: 0.15 THOUSAND/ÂΜL (ref 0–0.61)
EOSINOPHIL NFR BLD AUTO: 2 % (ref 0–6)
ERYTHROCYTE [DISTWIDTH] IN BLOOD BY AUTOMATED COUNT: 13.3 % (ref 11.6–15.1)
GFR SERPL CREATININE-BSD FRML MDRD: 70 ML/MIN/1.73SQ M
GLUCOSE P FAST SERPL-MCNC: 82 MG/DL (ref 65–99)
HCT VFR BLD AUTO: 43.1 % (ref 34.8–46.1)
HDLC SERPL-MCNC: 59 MG/DL
HGB BLD-MCNC: 13.7 G/DL (ref 11.5–15.4)
IMM GRANULOCYTES # BLD AUTO: 0.01 THOUSAND/UL (ref 0–0.2)
IMM GRANULOCYTES NFR BLD AUTO: 0 % (ref 0–2)
LDLC SERPL CALC-MCNC: 135 MG/DL (ref 0–100)
LYMPHOCYTES # BLD AUTO: 2.27 THOUSANDS/ÂΜL (ref 0.6–4.47)
LYMPHOCYTES NFR BLD AUTO: 35 % (ref 14–44)
MCH RBC QN AUTO: 29 PG (ref 26.8–34.3)
MCHC RBC AUTO-ENTMCNC: 31.8 G/DL (ref 31.4–37.4)
MCV RBC AUTO: 91 FL (ref 82–98)
MONOCYTES # BLD AUTO: 0.5 THOUSAND/ÂΜL (ref 0.17–1.22)
MONOCYTES NFR BLD AUTO: 8 % (ref 4–12)
NEUTROPHILS # BLD AUTO: 3.53 THOUSANDS/ÂΜL (ref 1.85–7.62)
NEUTS SEG NFR BLD AUTO: 54 % (ref 43–75)
NONHDLC SERPL-MCNC: 157 MG/DL
NRBC BLD AUTO-RTO: 0 /100 WBCS
PLATELET # BLD AUTO: 376 THOUSANDS/UL (ref 149–390)
PMV BLD AUTO: 10.5 FL (ref 8.9–12.7)
POTASSIUM SERPL-SCNC: 3.8 MMOL/L (ref 3.5–5.3)
PROT SERPL-MCNC: 8.2 G/DL (ref 6.4–8.4)
RBC # BLD AUTO: 4.72 MILLION/UL (ref 3.81–5.12)
SODIUM SERPL-SCNC: 136 MMOL/L (ref 135–147)
TRIGL SERPL-MCNC: 109 MG/DL
TSH SERPL DL<=0.05 MIU/L-ACNC: 3.7 UIU/ML (ref 0.45–4.5)
WBC # BLD AUTO: 6.49 THOUSAND/UL (ref 4.31–10.16)

## 2023-08-14 PROCEDURE — 36415 COLL VENOUS BLD VENIPUNCTURE: CPT

## 2023-08-14 PROCEDURE — 85025 COMPLETE CBC W/AUTO DIFF WBC: CPT

## 2023-08-14 PROCEDURE — 80053 COMPREHEN METABOLIC PANEL: CPT

## 2023-08-14 PROCEDURE — 80061 LIPID PANEL: CPT

## 2023-08-14 PROCEDURE — 99214 OFFICE O/P EST MOD 30 MIN: CPT | Performed by: INTERNAL MEDICINE

## 2023-08-14 PROCEDURE — 84443 ASSAY THYROID STIM HORMONE: CPT

## 2023-08-14 NOTE — PROGRESS NOTES
Assessment/Plan:    Generalized anxiety disorder with panic attacks  -Improved but not optimally controlled since she started her FMLA leave. She wants to complete her 2 months off work and does not feel that she can return to work at this time, even for part-time.  -Patient is not interested in starting buspirone at this time because she does not feel that she can take a medication 2 times or 3 times a day. Of note, we are holding off on SSRIs and SNRI because of side effects of weight gain and sexual side effects.  -Continue with as needed hydroxyzine as well as psychotherapy  -Follow-up in 1 month    Reactive depression  -Improving  -Continue Wellbutrin at current dose  -Continue with psychotherapy    Right knee pain  -Much improved status post rest from work  -We will continue to monitor patient clinically    Left foot pain  -Improved status post rest from work  -We will continue to monitor patient closely clinically    Suicidal ideation  -Resolved and patient also denies any homicidal ideation. Transient elevated blood pressure  -Blood pressure is 124/64 today  -We will continue to monitor patient closely clinically  -Continue with a low-salt diet    Need for annual physical examination  -We will order CBC, CMP, TSH, lipid panel  -Last physical was done last month but labs were done on 9/2/2022  -We will follow-up with the results of her labs     Diagnoses and all orders for this visit:    Generalized anxiety disorder  -     CBC and differential; Future  -     TSH, 3rd generation with Free T4 reflex; Future  -     Comprehensive metabolic panel; Future  -     Lipid panel; Future    Reactive depression  -     CBC and differential; Future  -     TSH, 3rd generation with Free T4 reflex; Future  -     Comprehensive metabolic panel; Future  -     Lipid panel; Future    Panic attacks  -     CBC and differential; Future  -     TSH, 3rd generation with Free T4 reflex;  Future  -     Comprehensive metabolic panel; Future  -     Lipid panel; Future    Suicidal ideation  -     CBC and differential; Future  -     TSH, 3rd generation with Free T4 reflex; Future  -     Comprehensive metabolic panel; Future  -     Lipid panel; Future    Annual physical exam  -     CBC and differential; Future  -     TSH, 3rd generation with Free T4 reflex; Future  -     Comprehensive metabolic panel; Future  -     Lipid panel; Future    Transient elevated blood pressure    Left foot pain    Chronic pain of right knee           Subjective:      Patient ID: Rodrigo Rodrigues is a 34 y.o. female. HPI    Patient presents for a follow-up visit regarding her generalized anxiety disorder, reactive depression, panic attacks and elevated blood pressure. She states that she has been taking her medications as prescribed. Patient was seen about 1 month ago and started on FMLA leave because of her severe depression, anxiety with panic attacks. She has been off work for the past month. .  Today pt states that she feels better  She is seeing her new psychotherapist once a week   States that since she has a new one and is still getting to know her, the visits are still once a week and she will eventually get to start seeing her twice a week. Depression is better and not as bad as it used to be   She feels like the Wellbutrin is helping now. Not seeing a psychiatrist   She states that the anxiety is better as well   She states that it was initially bad but has now gotten better   She would like to take the full 8 weeks off and return to work next month. She does not feel like she can return to work now. She does not feel that she can go back to her old job at all, even after the 8 weeks of. She states that when she thought that she would have to go back to her job, she had the worst panic attack of her life and could not breath and felt like she was going to throw up.   Last panic attack was 3.5 weeks ago  She states that she is trying to get a new job but keeps getting rejections. She inquired about being started on buspirone which she sees her psychotherapist suggested. We discussed the fact that buspirone is taken twice 3 times a day. She does not think that she would like to take buspar 2-3 times a day and and wants to wait to see how the therapy will help her. She states that her knee pain is better and her foot pain is better as well. States that her blood pressure is also improved. She denies fever, chills, night sweats, headache, dizziness, chest pain, shortness of breath, palpitations, nausea, vomiting, abdominal pain, diarrhea, constipation, myalgias. The following portions of the patient's history were reviewed and updated as appropriate:   She  has a past medical history of Depression, Disease of thyroid gland, Hyperlipidemia, Hypothyroidism, and Polycystic ovary syndrome.   She   Patient Active Problem List    Diagnosis Date Noted   • Other chest pain 06/08/2023   • Chronic pain of right knee 12/15/2022   • Left foot pain 12/15/2022   • Transient elevated blood pressure 12/15/2022   • Gastroesophageal reflux disease without esophagitis 06/16/2021   • PCOS (polycystic ovarian syndrome) 11/19/2020   • Tinea pedis 11/19/2020   • Gynecologic exam normal 11/19/2020   • Need for influenza vaccination 11/05/2020   • Influenza vaccination declined by patient 11/05/2020   • Other hyperlipidemia 07/29/2020   • Annual physical exam 07/15/2020   • BMI 36.0-36.9,adult 07/15/2020   • Reactive depression 07/15/2020   • Generalized anxiety disorder 07/15/2020   • Panic attacks 07/15/2020   • Endometrial polyp 11/12/2019   • Menorrhagia with irregular cycle 07/26/2019   • Irregular menses 07/26/2019   • Screening examination for STD (sexually transmitted disease) 07/26/2019   • Dyspareunia in female 07/26/2019   • Subclinical hypothyroidism 05/25/2018     She  has a past surgical history that includes Leslie tooth extraction and pr hysteroscopy bx endometrium&/polypc w/wo d&c (N/A, 11/19/2019). Her family history includes Alcohol abuse in her maternal grandfather; Arthritis in her maternal grandmother; Breast cancer in her maternal grandmother; Breast cancer additional onset in her maternal grandmother; Cancer in her maternal grandmother; Dementia in her paternal grandmother; Diabetes in her father; Hearing loss in her maternal grandmother; Hypertension in her mother; No Known Problems in her brother, paternal grandfather, and sister; Rheum arthritis in her maternal aunt; Thyroid disease in her maternal grandmother. She  reports that she has never smoked. She has never used smokeless tobacco. She reports that she does not currently use alcohol. She reports that she does not use drugs. Current Outpatient Medications   Medication Sig Dispense Refill   • buPROPion (Wellbutrin XL) 150 mg 24 hr tablet Take 2 tablets (300 mg total) by mouth every morning 60 tablet 5   • hydrOXYzine HCL (ATARAX) 25 mg tablet Take 1 tablet (25 mg total) by mouth daily at bedtime as needed for anxiety (insomnia) 60 tablet 1   • norethindrone-ethinyl estradiol (MICROGESTIN 1/20) 1-20 MG-MCG per tablet Take 1 tablet by mouth daily for 28 days 84 tablet 0     No current facility-administered medications for this visit. Current Outpatient Medications on File Prior to Visit   Medication Sig   • buPROPion (Wellbutrin XL) 150 mg 24 hr tablet Take 2 tablets (300 mg total) by mouth every morning   • hydrOXYzine HCL (ATARAX) 25 mg tablet Take 1 tablet (25 mg total) by mouth daily at bedtime as needed for anxiety (insomnia)   • norethindrone-ethinyl estradiol (MICROGESTIN 1/20) 1-20 MG-MCG per tablet Take 1 tablet by mouth daily for 28 days     No current facility-administered medications on file prior to visit. She has No Known Allergies. .    Review of Systems   Constitutional: Negative for activity change, chills, fatigue, fever and unexpected weight change.    HENT: Negative for ear pain, postnasal drip, rhinorrhea, sinus pressure and sore throat. Eyes: Negative for pain. Respiratory: Negative for cough, choking, chest tightness, shortness of breath and wheezing. Cardiovascular: Negative for chest pain, palpitations and leg swelling. Gastrointestinal: Negative for abdominal pain, constipation, diarrhea, nausea and vomiting. Genitourinary: Negative for dysuria and hematuria. Musculoskeletal: Positive for arthralgias (Knee pain and foot pain are much improved). Negative for back pain, gait problem, joint swelling, myalgias and neck stiffness. Skin: Negative for pallor and rash. Neurological: Negative for dizziness, tremors, seizures, syncope, light-headedness and headaches. Hematological: Negative for adenopathy. Psychiatric/Behavioral: Positive for dysphoric mood (improving ) and suicidal ideas (resolved). Negative for behavioral problems and sleep disturbance (sleeping better). The patient is nervous/anxious (improving). Objective:      /64 (BP Location: Left arm, Patient Position: Sitting, Cuff Size: Standard)   Pulse 94   Temp 97.8 °F (36.6 °C) (Temporal)   Ht 5' 5" (1.651 m)   Wt 97.5 kg (215 lb)   SpO2 97%   BMI 35.78 kg/m²          Physical Exam  Constitutional:       General: She is not in acute distress. Appearance: She is well-developed. She is not diaphoretic. HENT:      Head: Normocephalic and atraumatic. Right Ear: External ear normal.      Left Ear: External ear normal.      Nose: Nose normal.      Mouth/Throat:      Pharynx: No oropharyngeal exudate. Eyes:      General: No scleral icterus. Right eye: No discharge. Left eye: No discharge. Conjunctiva/sclera: Conjunctivae normal.      Pupils: Pupils are equal, round, and reactive to light. Neck:      Thyroid: No thyromegaly. Vascular: No JVD. Trachea: No tracheal deviation. Cardiovascular:      Rate and Rhythm: Normal rate and regular rhythm. Heart sounds: Normal heart sounds. No murmur heard. No friction rub. No gallop. Pulmonary:      Effort: Pulmonary effort is normal. No respiratory distress. Breath sounds: Normal breath sounds. No wheezing or rales. Chest:      Chest wall: No tenderness. Abdominal:      General: Bowel sounds are normal. There is no distension. Palpations: Abdomen is soft. There is no mass. Tenderness: There is no abdominal tenderness. There is no guarding or rebound. Musculoskeletal:         General: No tenderness or deformity. Normal range of motion. Cervical back: Normal range of motion and neck supple. Lymphadenopathy:      Cervical: No cervical adenopathy. Skin:     General: Skin is warm and dry. Coloration: Skin is not pale. Findings: No erythema or rash. Neurological:      Mental Status: She is alert and oriented to person, place, and time. Cranial Nerves: No cranial nerve deficit. Motor: No abnormal muscle tone. Coordination: Coordination normal.      Deep Tendon Reflexes: Reflexes are normal and symmetric.    Psychiatric:         Behavior: Behavior normal.           Appointment on 08/14/2023   Component Date Value Ref Range Status   • WBC 08/14/2023 6.49  4.31 - 10.16 Thousand/uL Final   • RBC 08/14/2023 4.72  3.81 - 5.12 Million/uL Final   • Hemoglobin 08/14/2023 13.7  11.5 - 15.4 g/dL Final   • Hematocrit 08/14/2023 43.1  34.8 - 46.1 % Final   • MCV 08/14/2023 91  82 - 98 fL Final   • MCH 08/14/2023 29.0  26.8 - 34.3 pg Final   • MCHC 08/14/2023 31.8  31.4 - 37.4 g/dL Final   • RDW 08/14/2023 13.3  11.6 - 15.1 % Final   • MPV 08/14/2023 10.5  8.9 - 12.7 fL Final   • Platelets 75/03/8521 376  149 - 390 Thousands/uL Final   • nRBC 08/14/2023 0  /100 WBCs Final   • Neutrophils Relative 08/14/2023 54  43 - 75 % Final   • Immat GRANS % 08/14/2023 0  0 - 2 % Final   • Lymphocytes Relative 08/14/2023 35  14 - 44 % Final   • Monocytes Relative 08/14/2023 8  4 - 12 % Final   • Eosinophils Relative 08/14/2023 2  0 - 6 % Final   • Basophils Relative 08/14/2023 1  0 - 1 % Final   • Neutrophils Absolute 08/14/2023 3.53  1.85 - 7.62 Thousands/µL Final   • Immature Grans Absolute 08/14/2023 0.01  0.00 - 0.20 Thousand/uL Final   • Lymphocytes Absolute 08/14/2023 2.27  0.60 - 4.47 Thousands/µL Final   • Monocytes Absolute 08/14/2023 0.50  0.17 - 1.22 Thousand/µL Final   • Eosinophils Absolute 08/14/2023 0.15  0.00 - 0.61 Thousand/µL Final   • Basophils Absolute 08/14/2023 0.03  0.00 - 0.10 Thousands/µL Final   • TSH 3RD GENERATON 08/14/2023 3.696  0.450 - 4.500 uIU/mL Final    The recommended reference ranges for TSH during pregnancy are as follows:   First trimester 0.1 to 2.5 uIU/mL   Second trimester  0.2 to 3.0 uIU/mL   Third trimester 0.3 to 3.0 uIU/m    Note: Normal ranges may not apply to patients who are transgender, non-binary, or whose legal sex, sex at birth, and gender identity differ. Adult TSH (3rd generation) reference range follows the recommended guidelines of the American Thyroid Association, January, 2020. • Sodium 08/14/2023 136  135 - 147 mmol/L Final   • Potassium 08/14/2023 3.8  3.5 - 5.3 mmol/L Final   • Chloride 08/14/2023 107  96 - 108 mmol/L Final   • CO2 08/14/2023 24  21 - 32 mmol/L Final   • ANION GAP 08/14/2023 5  mmol/L Final   • BUN 08/14/2023 14  5 - 25 mg/dL Final   • Creatinine 08/14/2023 1.06  0.60 - 1.30 mg/dL Final    Standardized to IDMS reference method   • Glucose, Fasting 08/14/2023 82  65 - 99 mg/dL Final    Specimen collection should occur prior to Sulfasalazine administration due to the potential for falsely depressed results. Specimen collection should occur prior to Sulfapyridine administration due to the potential for falsely elevated results.    • Calcium 08/14/2023 9.4  8.3 - 10.1 mg/dL Final   • AST 08/14/2023 16  5 - 45 U/L Final    Specimen collection should occur prior to Sulfasalazine administration due to the potential for falsely depressed results. • ALT 08/14/2023 24  12 - 78 U/L Final    Specimen collection should occur prior to Sulfasalazine and/or Sulfapyridine administration due to the potential for falsely depressed results. • Alkaline Phosphatase 08/14/2023 78  46 - 116 U/L Final   • Total Protein 08/14/2023 8.2  6.4 - 8.4 g/dL Final   • Albumin 08/14/2023 3.5  3.5 - 5.0 g/dL Final   • Total Bilirubin 08/14/2023 0.41  0.20 - 1.00 mg/dL Final    Use of this assay is not recommended for patients undergoing treatment with eltrombopag due to the potential for falsely elevated results. • eGFR 08/14/2023 70  ml/min/1.73sq m Final   • Cholesterol 08/14/2023 216 (H)  See Comment mg/dL Final    Cholesterol:         Pediatric <18 Years        Desirable          <170 mg/dL      Borderline High    170-199 mg/dL      High               >=200 mg/dL        Adult >=18 Years            Desirable         <200 mg/dL      Borderline High   200-239 mg/dL      High              >239 mg/dL     • Triglycerides 08/14/2023 109  See Comment mg/dL Final    Triglyceride:     0-9Y            <75mg/dL     10Y-17Y         <90 mg/dL       >=18Y     Normal          <150 mg/dL     Borderline High 150-199 mg/dL     High            200-499 mg/dL        Very High       >499 mg/dL    Specimen collection should occur prior to N-Acetylcysteine or Metamizole administration due to the potential for falsely depressed results. • HDL, Direct 08/14/2023 59  >=50 mg/dL Final    Specimen collection should occur prior to Metamizole administration due to the potential for falsley depressed results. • LDL Calculated 08/14/2023 135 (H)  0 - 100 mg/dL Final    LDL Cholesterol:     Optimal           <100 mg/dl     Near Optimal      100-129 mg/dl     Above Optimal       Borderline High 130-159 mg/dl       High            160-189 mg/dl       Very High       >189 mg/dl         This screening LDL is a calculated result.    It does not have the accuracy of the Direct birth, and gender identity differ. Adult TSH (3rd generation) reference range follows the recommended guidelines of the American Thyroid Association, January, 2020. • Sodium 09/02/2022 140  135 - 147 mmol/L Final   • Potassium 09/02/2022 3.6  3.5 - 5.3 mmol/L Final   • Chloride 09/02/2022 104  96 - 108 mmol/L Final   • CO2 09/02/2022 28  21 - 32 mmol/L Final   • ANION GAP 09/02/2022 8  4 - 13 mmol/L Final   • BUN 09/02/2022 14  5 - 25 mg/dL Final   • Creatinine 09/02/2022 0.91  0.60 - 1.30 mg/dL Final    Standardized to IDMS reference method   • Glucose, Fasting 09/02/2022 94  65 - 99 mg/dL Final    Specimen collection should occur prior to Sulfasalazine administration due to the potential for falsely depressed results. Specimen collection should occur prior to Sulfapyridine administration due to the potential for falsely elevated results. • Calcium 09/02/2022 8.6  8.3 - 10.1 mg/dL Final   • AST 09/02/2022 13  5 - 45 U/L Final    Specimen collection should occur prior to Sulfasalazine administration due to the potential for falsely depressed results. • ALT 09/02/2022 12  12 - 78 U/L Final    Specimen collection should occur prior to Sulfasalazine administration due to the potential for falsely depressed results. • Alkaline Phosphatase 09/02/2022 95  46 - 116 U/L Final   • Total Protein 09/02/2022 7.8  6.4 - 8.4 g/dL Final   • Albumin 09/02/2022 3.5  3.5 - 5.0 g/dL Final   • Total Bilirubin 09/02/2022 0.24  0.20 - 1.00 mg/dL Final    Use of this assay is not recommended for patients undergoing treatment with eltrombopag due to the potential for falsely elevated results.    • eGFR 09/02/2022 84  ml/min/1.73sq m Final   • Cholesterol 09/02/2022 211 (H)  See Comment mg/dL Final    Cholesterol:         Pediatric <18 Years        Desirable          <170 mg/dL      Borderline High    170-199 mg/dL      High               >=200 mg/dL        Adult >=18 Years            Desirable         <200 mg/dL      Borderline High   200-239 mg/dL      High              >239 mg/dL     • Triglycerides 09/02/2022 90  See Comment mg/dL Final    Triglyceride:     0-9Y            <75mg/dL     10Y-17Y         <90 mg/dL       >=18Y     Normal          <150 mg/dL     Borderline High 150-199 mg/dL     High            200-499 mg/dL        Very High       >499 mg/dL    Specimen collection should occur prior to N-Acetylcysteine or Metamizole administration due to the potential for falsely depressed results. • HDL, Direct 09/02/2022 55  >=50 mg/dL Final    Specimen collection should occur prior to Metamizole administration due to the potential for falsley depressed results. • LDL Calculated 09/02/2022 138 (H)  0 - 100 mg/dL Final    LDL Cholesterol:     Optimal           <100 mg/dl     Near Optimal      100-129 mg/dl     Above Optimal       Borderline High 130-159 mg/dl       High            160-189 mg/dl       Very High       >189 mg/dl         This screening LDL is a calculated result. It does not have the accuracy of the Direct Measured LDL in the monitoring of patients with hyperlipidemia and/or statin therapy. Direct Measure LDL (SEH405) must be ordered separately in these patients.    • Non-HDL-Chol (CHOL-HDL) 09/02/2022 156  mg/dl Final

## 2023-08-26 DIAGNOSIS — N91.2 AMENORRHEA: ICD-10-CM

## 2023-08-26 DIAGNOSIS — E28.2 PCOS (POLYCYSTIC OVARIAN SYNDROME): ICD-10-CM

## 2023-08-26 DIAGNOSIS — N92.6 IRREGULAR MENSES: ICD-10-CM

## 2023-08-28 RX ORDER — NORETHINDRONE ACETATE AND ETHINYL ESTRADIOL 1; 20 MG/1; UG/1
1 TABLET ORAL DAILY
Qty: 84 TABLET | Refills: 1 | OUTPATIENT
Start: 2023-08-28

## 2023-08-28 RX ORDER — NORETHINDRONE ACETATE AND ETHINYL ESTRADIOL 1; .02 MG/1; MG/1
1 TABLET ORAL DAILY
Qty: 84 TABLET | Refills: 0 | Status: SHIPPED | OUTPATIENT
Start: 2023-08-28 | End: 2023-09-07 | Stop reason: SDUPTHER

## 2023-09-07 ENCOUNTER — ANNUAL EXAM (OUTPATIENT)
Dept: OBGYN CLINIC | Facility: CLINIC | Age: 33
End: 2023-09-07
Payer: COMMERCIAL

## 2023-09-07 VITALS
SYSTOLIC BLOOD PRESSURE: 130 MMHG | BODY MASS INDEX: 36.42 KG/M2 | HEIGHT: 65 IN | WEIGHT: 218.6 LBS | DIASTOLIC BLOOD PRESSURE: 88 MMHG

## 2023-09-07 DIAGNOSIS — E28.2 PCOS (POLYCYSTIC OVARIAN SYNDROME): ICD-10-CM

## 2023-09-07 DIAGNOSIS — N91.2 AMENORRHEA: ICD-10-CM

## 2023-09-07 DIAGNOSIS — N92.6 IRREGULAR MENSES: ICD-10-CM

## 2023-09-07 DIAGNOSIS — Z01.419 WOMEN'S ANNUAL ROUTINE GYNECOLOGICAL EXAMINATION: Primary | ICD-10-CM

## 2023-09-07 PROCEDURE — 99395 PREV VISIT EST AGE 18-39: CPT | Performed by: NURSE PRACTITIONER

## 2023-09-07 RX ORDER — NORETHINDRONE ACETATE AND ETHINYL ESTRADIOL 1; .02 MG/1; MG/1
1 TABLET ORAL DAILY
Qty: 84 TABLET | Refills: 3 | Status: SHIPPED | OUTPATIENT
Start: 2023-09-07 | End: 2023-10-05

## 2023-09-07 NOTE — PROGRESS NOTES
Subjective    HPI:     Laverne Sparks is a 34 y.o. nulliparous female. Her menstrual cycles are regular and predictable. Her current method of contraception includes OCP. She denies issues with intimacy. She denies /GI and Gyn complaints. She feels safe at home. She states her depression/anxiety stable. Medical, surgical and family history reviewed. Her dental care is up-to-date. She tries to eat a healthy diet. She is not exercising regularly. She is nothappy with her weight. Visit Vitals  /88 (BP Location: Left arm, Patient Position: Sitting, Cuff Size: Standard)   Ht 5' 5" (1.651 m)   Wt 99.2 kg (218 lb 9.6 oz)   LMP 2023 (Approximate)   BMI 36.38 kg/m²   OB Status Birth Control   Smoking Status Never   BSA 2.05 m²       Gynecologic History    Patient's last menstrual period was 2023 (approximate). Last Pap: 2022. Results were: normal    Obstetric and Medical History    OB History    Para Term  AB Living   0 0 0 0 0 0   SAB IAB Ectopic Multiple Live Births   0 0 0 0 0       Past Medical History:   Diagnosis Date   • Depression    • Disease of thyroid gland    • Hyperlipidemia    • Hypothyroidism    • Polycystic ovary syndrome        Past Surgical History:   Procedure Laterality Date   • [place] HYSTEROSCOPY BX ENDOMETRIUM&/POLYPC W/WO D&C N/A 2019    Procedure: DILATATION AND CURETTAGE (D&C) WITH HYSTEROSCOPY;  Surgeon: Gallito Waldrop MD;  Location: [facility];  Service: Gynecology   • WISDOM TOOTH EXTRACTION         The following portions of the patient's history were reviewed and updated as appropriate: allergies, current medications, past family history, past medical history, past social history, past surgical history and problem list.    Review of Systems    Pertinent items are noted in HPI. Objective    Physical Exam  Constitutional:       Appearance: Normal appearance. She is well-developed.    Genitourinary:      Vulva, bladder and urethral meatus normal.      No lesions in the vagina. Right Labia: No rash, tenderness, lesions, skin changes or Bartholin's cyst.     Left Labia: No tenderness, lesions, skin changes, Bartholin's cyst or rash. No labial fusion noted. No inguinal adenopathy present in the right or left side. No vaginal discharge, erythema, tenderness, bleeding or granulation tissue. No vaginal prolapse present. No vaginal atrophy present. Right Adnexa: not tender, not full and no mass present. Left Adnexa: not tender, not full and no mass present. Cervix is nulliparous. No cervical motion tenderness, discharge, friability, lesion, polyp or nabothian cyst.      Uterus is not enlarged, tender, irregular or prolapsed. No uterine mass detected. Uterus is anteverted. Pelvic exam was performed with patient in the lithotomy position. Breasts:     Breasts are symmetrical.      Right: No inverted nipple, mass, nipple discharge, skin change or tenderness. Left: No inverted nipple, mass, nipple discharge, skin change or tenderness. HENT:      Head: Normocephalic and atraumatic. Neck:      Thyroid: No thyromegaly. Cardiovascular:      Rate and Rhythm: Normal rate and regular rhythm. Heart sounds: Normal heart sounds, S1 normal and S2 normal.   Pulmonary:      Effort: Pulmonary effort is normal.      Breath sounds: Normal breath sounds. Abdominal:      General: Bowel sounds are normal. There is no distension. Palpations: Abdomen is soft. There is no mass. Tenderness: There is no abdominal tenderness. There is no guarding. Hernia: There is no hernia in the left inguinal area or right inguinal area. Musculoskeletal:      Cervical back: Neck supple. Lymphadenopathy:      Cervical: No cervical adenopathy. Upper Body:      Right upper body: No supraclavicular or axillary adenopathy. Left upper body: No supraclavicular or axillary adenopathy. Lower Body: No right inguinal adenopathy. No left inguinal adenopathy. Neurological:      Mental Status: She is alert. Skin:     General: Skin is warm and dry. Findings: No rash. Psychiatric:         Attention and Perception: Attention and perception normal.         Mood and Affect: Mood and affect normal.         Speech: Speech normal.         Behavior: Behavior is cooperative. Thought Content: Thought content normal.         Cognition and Memory: Cognition and memory normal.         Judgment: Judgment normal.   Vitals and nursing note reviewed. Assessment and Plan    Artur Champion was seen today for gynecologic exam.    Diagnoses and all orders for this visit:    Women's annual routine gynecological examination    Amenorrhea  -     norethindrone-ethinyl estradiol (MICROGESTIN 1/20) 1-20 MG-MCG per tablet; Take 1 tablet by mouth daily for 28 days    PCOS (polycystic ovarian syndrome)  -     norethindrone-ethinyl estradiol (MICROGESTIN 1/20) 1-20 MG-MCG per tablet; Take 1 tablet by mouth daily for 28 days    Irregular menses  -     norethindrone-ethinyl estradiol (MICROGESTIN 1/20) 1-20 MG-MCG per tablet; Take 1 tablet by mouth daily for 28 days      Patient informed of a Stable GYN exam. A pap smear was not performed due to a negative pap in 2022. I have discussed the importance of exercise and healthy diet as well as adequate intake of calcium and vitamin D. The current ASCCP guidelines were reviewed. The low risk patient will receive pap smear screening every 3 years until the age of 34 and then every 3 to 5 years with HPV co-testing from the ages of 32-69. I emphasized the importance of an annual pelvic and breast exam. A yearly mammogram is recommended for breast cancer screening starting at age 36. Results will be released to [facility], if abnormal will call to review and discuss treatment plan. All questions have been answered to her satisfaction.        Contraception: OCP (estrogen/progesterone). Follow up in: 1 year or sooner if needed.

## 2023-09-14 ENCOUNTER — OFFICE VISIT (OUTPATIENT)
Dept: INTERNAL MEDICINE CLINIC | Age: 33
End: 2023-09-14
Payer: COMMERCIAL

## 2023-09-14 VITALS
HEART RATE: 92 BPM | BODY MASS INDEX: 35.97 KG/M2 | TEMPERATURE: 98 F | SYSTOLIC BLOOD PRESSURE: 126 MMHG | DIASTOLIC BLOOD PRESSURE: 84 MMHG | WEIGHT: 215.9 LBS | HEIGHT: 65 IN | OXYGEN SATURATION: 98 %

## 2023-09-14 DIAGNOSIS — F41.1 GENERALIZED ANXIETY DISORDER: Primary | ICD-10-CM

## 2023-09-14 DIAGNOSIS — F32.9 REACTIVE DEPRESSION: ICD-10-CM

## 2023-09-14 DIAGNOSIS — M25.561 CHRONIC PAIN OF RIGHT KNEE: ICD-10-CM

## 2023-09-14 DIAGNOSIS — G89.29 CHRONIC PAIN OF RIGHT KNEE: ICD-10-CM

## 2023-09-14 DIAGNOSIS — M79.672 LEFT FOOT PAIN: ICD-10-CM

## 2023-09-14 DIAGNOSIS — F41.0 PANIC ATTACKS: ICD-10-CM

## 2023-09-14 PROCEDURE — 99214 OFFICE O/P EST MOD 30 MIN: CPT | Performed by: INTERNAL MEDICINE

## 2023-09-14 NOTE — PROGRESS NOTES
Assessment/Plan:    Generalized anxiety disorder with panic attacks  -Improving  -Continue to follow with psychiatry and psychotherapy   -FMLA forms will be signed by psychiatry going forward    Reactive depression  -Improving  -Continue with bupropion and continue to follow with psychiatry and psychotherapy    Left foot pain  -Improving  -Continue with over-the-counter analgesics and follow-up with physical therapy    Chronic right knee pain  -Stable  -Continue with over-the-counter analgesics and follow-up with physical therapy    Lab results were reviewed with patient. She will follow-up in about 3 months for an annual physical exam.     Diagnoses and all orders for this visit:    Generalized anxiety disorder    Left foot pain    Chronic pain of right knee    Reactive depression    Panic attacks          Subjective:      Patient ID: Fantasma Turner is a 34 y.o. female. HPI  Patient presents for a follow-up visit regarding her generalized anxiety disorder, reactive depression, chronic right knee pain, left foot pain, panic attacks. She states that she has been taking her medication as prescribed. Today pt states doing that her anxiety is doing better  Depression is also better  She states that she is seeing psychiatry and started seeing them 2 months ago and she is also seeing a therapist  She states that she has seen the psychiatrist twice already and she sees the therapist that works with the psychiatrist and is trying to find a good fit. Seeing a therapist at least once a week  Going back and fort between her old therapist and the new one. She states that she put in a request to extend her leave of absence and states that after she read the e-mail from her job she had a panic attack and every time she thinks of going back to the job she either has a panic attack or gets very anxious. If the leave of absence is not approved, she will be unpaid but she states that she is okay with that.   She states that she is looking for a new job and states that she has been looking for a job since she went on her FMLA leave and has applied to up to 48 jobs and got one response today. Still taking the welbutrin   Sent new FMLA forms to the psychiatrist and therapist      The following portions of the patient's history were reviewed and updated as appropriate:   She  has a past medical history of Depression, Disease of thyroid gland, Hyperlipidemia, Hypothyroidism, and Polycystic ovary syndrome. She   Patient Active Problem List    Diagnosis Date Noted   • Other chest pain 06/08/2023   • Chronic pain of right knee 12/15/2022   • Left foot pain 12/15/2022   • Transient elevated blood pressure 12/15/2022   • Gastroesophageal reflux disease without esophagitis 06/16/2021   • PCOS (polycystic ovarian syndrome) 11/19/2020   • Tinea pedis 11/19/2020   • Gynecologic exam normal 11/19/2020   • Need for influenza vaccination 11/05/2020   • Influenza vaccination declined by patient 11/05/2020   • Other hyperlipidemia 07/29/2020   • Annual physical exam 07/15/2020   • BMI 36.0-36.9,adult 07/15/2020   • Reactive depression 07/15/2020   • Generalized anxiety disorder 07/15/2020   • Panic attacks 07/15/2020   • Endometrial polyp 11/12/2019   • Menorrhagia with irregular cycle 07/26/2019   • Irregular menses 07/26/2019   • Screening examination for STD (sexually transmitted disease) 07/26/2019   • Dyspareunia in female 07/26/2019   • Subclinical hypothyroidism 05/25/2018     She  has a past surgical history that includes [place] tooth extraction and pr hysteroscopy bx endometrium&/polypc w/wo d&c (N/A, 11/19/2019).   Her family history includes Alcohol abuse in her maternal grandfather; Arthritis in her maternal grandmother; Breast cancer in her maternal grandmother; Breast cancer additional onset in her maternal grandmother; Cancer in her maternal grandmother; Dementia in her paternal grandmother; Diabetes in her father; Hearing loss in her maternal grandmother; Hypertension in her mother; No Known Problems in her brother, paternal grandfather, and sister; Rheum arthritis in her maternal aunt; Thyroid disease in her maternal grandmother. She  reports that she has never smoked. She has never used smokeless tobacco. She reports current alcohol use. She reports that she does not use drugs. Current Outpatient Medications   Medication Sig Dispense Refill   • buPROPion (Wellbutrin XL) 150 mg 24 hr tablet Take 2 tablets (300 mg total) by mouth every morning 60 tablet 5   • norethindrone-ethinyl estradiol (MICROGESTIN 1/20) 1-20 MG-MCG per tablet Take 1 tablet by mouth daily for 28 days 84 tablet 3     No current facility-administered medications for this visit. Current Outpatient Medications on File Prior to Visit   Medication Sig   • buPROPion (Wellbutrin XL) 150 mg 24 hr tablet Take 2 tablets (300 mg total) by mouth every morning   • norethindrone-ethinyl estradiol (MICROGESTIN 1/20) 1-20 MG-MCG per tablet Take 1 tablet by mouth daily for 28 days     No current facility-administered medications on file prior to visit. She has No Known Allergies. .    Review of Systems   Constitutional: Negative for activity change, chills, fatigue, fever and unexpected weight change. HENT: Negative for ear pain, postnasal drip, rhinorrhea, sinus pressure and sore throat. Eyes: Negative for pain. Respiratory: Negative for cough, choking, chest tightness, shortness of breath and wheezing. Cardiovascular: Negative for chest pain, palpitations and leg swelling. Gastrointestinal: Negative for abdominal pain, constipation, diarrhea, nausea and vomiting. Genitourinary: Negative for dysuria and hematuria. Musculoskeletal: Positive for arthralgias (still has R knee pain and left foot is better, has not gone for PT cos she has to pay for it ). Negative for back pain, gait problem, joint swelling, myalgias and neck stiffness.    Skin: Negative for pallor and rash. Neurological: Negative for dizziness, tremors, seizures, syncope, light-headedness and headaches. Hematological: Negative for adenopathy. Psychiatric/Behavioral: Positive for dysphoric mood and sleep disturbance (not so good this week but was better before this week). Negative for behavioral problems. The patient is nervous/anxious (with occasional panic attacks). Objective:      /84 (BP Location: Left arm, Patient Position: Sitting, Cuff Size: Standard)   Pulse 92   Temp 98 °F (36.7 °C) (Temporal)   Ht 5' 5" (1.651 m)   Wt 97.9 kg (215 lb 14.4 oz)   LMP 08/29/2023 (Approximate)   SpO2 98%   BMI 35.93 kg/m²          Physical Exam  Constitutional:       General: She is not in acute distress. Appearance: She is well-developed. She is not diaphoretic. HENT:      Head: Normocephalic and atraumatic. Right Ear: External ear normal.      Left Ear: External ear normal.      Nose: Nose normal.      Mouth/Throat:      Mouth: Mucous membranes are dry. Pharynx: No oropharyngeal exudate. Eyes:      General: No scleral icterus. Right eye: No discharge. Left eye: No discharge. Conjunctiva/sclera: Conjunctivae normal.      Pupils: Pupils are equal, round, and reactive to light. Neck:      Thyroid: No thyromegaly. Vascular: No JVD. Trachea: No tracheal deviation. Cardiovascular:      Rate and Rhythm: Normal rate and regular rhythm. Heart sounds: Normal heart sounds. No murmur heard. No friction rub. No gallop. Pulmonary:      Effort: Pulmonary effort is normal. No respiratory distress. Breath sounds: Normal breath sounds. No wheezing or rales. Chest:      Chest wall: No tenderness. Abdominal:      General: Bowel sounds are normal. There is no distension. Palpations: Abdomen is soft. There is no mass. Tenderness: There is no abdominal tenderness. There is no guarding or rebound.    Musculoskeletal:         General: No tenderness or deformity. Normal range of motion. Cervical back: Normal range of motion and neck supple. Lymphadenopathy:      Cervical: No cervical adenopathy. Skin:     General: Skin is warm and dry. Coloration: Skin is not pale. Findings: No erythema or rash. Neurological:      Mental Status: She is alert and oriented to person, place, and time. Cranial Nerves: No cranial nerve deficit. Motor: No abnormal muscle tone. Coordination: Coordination normal.      Deep Tendon Reflexes: Reflexes are normal and symmetric. Psychiatric:         Mood and Affect: Mood is anxious (improved anxious affect) and depressed (improved dysphoric affect).          Behavior: Behavior normal.           Appointment on 08/14/2023   Component Date Value Ref Range Status   • WBC 08/14/2023 6.49  4.31 - 10.16 Thousand/uL Final   • RBC 08/14/2023 4.72  3.81 - 5.12 Million/uL Final   • Hemoglobin 08/14/2023 13.7  11.5 - 15.4 g/dL Final   • Hematocrit 08/14/2023 43.1  34.8 - 46.1 % Final   • MCV 08/14/2023 91  82 - 98 fL Final   • MCH 08/14/2023 29.0  26.8 - 34.3 pg Final   • MCHC 08/14/2023 31.8  31.4 - 37.4 g/dL Final   • RDW 08/14/2023 13.3  11.6 - 15.1 % Final   • MPV 08/14/2023 10.5  8.9 - 12.7 fL Final   • Platelets 95/04/6132 376  149 - 390 Thousands/uL Final   • nRBC 08/14/2023 0  /100 WBCs Final   • Neutrophils Relative 08/14/2023 54  43 - 75 % Final   • Immat GRANS % 08/14/2023 0  0 - 2 % Final   • Lymphocytes Relative 08/14/2023 35  14 - 44 % Final   • Monocytes Relative 08/14/2023 8  4 - 12 % Final   • Eosinophils Relative 08/14/2023 2  0 - 6 % Final   • Basophils Relative 08/14/2023 1  0 - 1 % Final   • Neutrophils Absolute 08/14/2023 3.53  1.85 - 7.62 Thousands/µL Final   • Immature Grans Absolute 08/14/2023 0.01  0.00 - 0.20 Thousand/uL Final   • Lymphocytes Absolute 08/14/2023 2.27  0.60 - 4.47 Thousands/µL Final   • Monocytes Absolute 08/14/2023 0.50  0.17 - 1.22 Thousand/µL Final   • Eosinophils Absolute 08/14/2023 0.15  0.00 - 0.61 Thousand/µL Final   • Basophils Absolute 08/14/2023 0.03  0.00 - 0.10 Thousands/µL Final   • TSH 3RD GENERATON 08/14/2023 3.696  0.450 - 4.500 uIU/mL Final    The recommended reference ranges for TSH during pregnancy are as follows:   First trimester 0.1 to 2.5 uIU/mL   Second trimester  0.2 to 3.0 uIU/mL   Third trimester 0.3 to 3.0 uIU/m    Note: Normal ranges may not apply to patients who are transgender, non-binary, or whose legal sex, sex at birth, and gender identity differ. Adult TSH (3rd generation) reference range follows the recommended guidelines of the American Thyroid Association, January, 2020. • Sodium 08/14/2023 136  135 - 147 mmol/L Final   • Potassium 08/14/2023 3.8  3.5 - 5.3 mmol/L Final   • Chloride 08/14/2023 107  96 - 108 mmol/L Final   • CO2 08/14/2023 24  21 - 32 mmol/L Final   • ANION GAP 08/14/2023 5  mmol/L Final   • BUN 08/14/2023 14  5 - 25 mg/dL Final   • Creatinine 08/14/2023 1.06  0.60 - 1.30 mg/dL Final    Standardized to IDMS reference method   • Glucose, Fasting 08/14/2023 82  65 - 99 mg/dL Final    Specimen collection should occur prior to Sulfasalazine administration due to the potential for falsely depressed results. Specimen collection should occur prior to Sulfapyridine administration due to the potential for falsely elevated results. • Calcium 08/14/2023 9.4  8.3 - 10.1 mg/dL Final   • AST 08/14/2023 16  5 - 45 U/L Final    Specimen collection should occur prior to Sulfasalazine administration due to the potential for falsely depressed results. • ALT 08/14/2023 24  12 - 78 U/L Final    Specimen collection should occur prior to Sulfasalazine and/or Sulfapyridine administration due to the potential for falsely depressed results.     • Alkaline Phosphatase 08/14/2023 78  46 - 116 U/L Final   • Total Protein 08/14/2023 8.2  6.4 - 8.4 g/dL Final   • Albumin 08/14/2023 3.5  3.5 - 5.0 g/dL Final   • Total Bilirubin 08/14/2023 0. 41  0.20 - 1.00 mg/dL Final    Use of this assay is not recommended for patients undergoing treatment with eltrombopag due to the potential for falsely elevated results. • eGFR 08/14/2023 70  ml/min/1.73sq m Final   • Cholesterol 08/14/2023 216 (H)  See Comment mg/dL Final    Cholesterol:         Pediatric <18 Years        Desirable          <170 mg/dL      Borderline High    170-199 mg/dL      High               >=200 mg/dL        Adult >=18 Years            Desirable         <200 mg/dL      Borderline High   200-239 mg/dL      High              >239 mg/dL     • Triglycerides 08/14/2023 109  See Comment mg/dL Final    Triglyceride:     0-9Y            <75mg/dL     10Y-17Y         <90 mg/dL       >=18Y     Normal          <150 mg/dL     Borderline High 150-199 mg/dL     High            200-499 mg/dL        Very High       >499 mg/dL    Specimen collection should occur prior to N-Acetylcysteine or Metamizole administration due to the potential for falsely depressed results. • HDL, Direct 08/14/2023 59  >=50 mg/dL Final    Specimen collection should occur prior to Metamizole administration due to the potential for falsley depressed results. • LDL Calculated 08/14/2023 135 (H)  0 - 100 mg/dL Final    LDL Cholesterol:     Optimal           <100 mg/dl     Near Optimal      100-129 mg/dl     Above Optimal       Borderline High 130-159 mg/dl       High            160-189 mg/dl       Very High       >189 mg/dl         This screening LDL is a calculated result. It does not have the accuracy of the Direct Measured LDL in the monitoring of patients with hyperlipidemia and/or statin therapy. Direct Measure LDL (EXU384) must be ordered separately in these patients.    • Non-HDL-Chol (CHOL-HDL) 08/14/2023 157  mg/dl Final

## 2023-12-06 NOTE — PROGRESS NOTES
PT Evaluation   Today's date: 2023  Patient name: Darin Bernheim  : 1990  MRN: [id number]  Referring provider: Shelli Horan DO  Dx:   Encounter Diagnosis     ICD-10-CM    1. Chronic pain of right knee  M25.561 Ambulatory Referral to Physical Therapy    G89.29 PT plan of care cert/re-cert      2. Left foot pain  M79.672 Ambulatory Referral to Physical Therapy     PT plan of care cert/re-cert          Assessment    Darin Bernheim is a pleasant 34 y.o. female who presents with a referring diagnosis of left foot pain and right knee pain. The patient's greatest concern is the pain she is experiencing in her knee. Unable to produce pain or find functional deficits within today's evaluation. Educated patient on findings and inability to produce sx within session, recommended patient reach out for follow up when her knee flares up again. Pt verbalized understanding and agreement of the plan No further referral is neccessary at this time based upon examination results. Positive prognostic indicators include motivation to improve, positive attitude, and age. Negative prognostic indicators include minimal changes in pain with movement. Impairments: none noted within today's session    Symptom Irritability: low    Problem List:  - none noted    Patient education performed this session included: plan of care    Patient verbalized excellent understanding of POC. Please contact me if you have any questions or recommendations. Thank you for the referral and the opportunity to share in [facility] care.        Plan    Patient would benefit from: Skilled PT  Planned modality interventions: biofeedback, cryotherapy, TENS, and thermotherapy (hot pack)  Planned therapy interventions: activity modification, behavior modification, body mechanics training, functional ROM exercises, graded exercise, HEP, joint mobilization, manual therapy, Bar taping, motor coordination training, neuromuscular re-education, patient education, postural training, strengthening, stretching, therapeutic activities, and therapeutic exercises    Frequency: 1x per week  Duration in weeks:  4 weeks    Plan of Care beginning date: 12/7/2023  Plan of Care expiration date: 4 weeks - 1/4/2024  Treatment plan discussed with: patient      Goals    Goals deferred until follow up. History    History of Present Illness  Mechanism of injury: her knee bothers her more, started about 18 months ago, she was sitting in a chair for 4 hours and was sleeping with her back flat and had her feet elevated and turned and twisted her knee and it has been hurting ever since. The knee is bothered by bending. No issue with walking or sitting. She has been avoiding bending it for the stairs. She has been avoiding squatting at the gym due to the pain.    Occupation - on disability, used to do a lot of standing (lab work)    Prior level of function: IND    Progression: no change    Previous treatment:  ice, heat, tumeric  Current treatment: physical therapy    Patient goals for therapy: decrease pain and return to sport    Pain   12/7/2023    Current 0-1/10    Best 0/10    Worst 5/10     Location: in the middle of the knee behind the kneecap, top of the knee cap  Description: burning and sharp  Aggravating factors:  bending  Relieving factors: change in position      Physical Examination    Red Flag Screen  (+): none    Sensation  Light touch: intact bilaterally    Reflexes:     LEFT/RIGHT  Patellar: 1+  / 1+   Achilles: 1+  / 1+       Lumbar Spine ROM   12/7/2023    Flexion 100%    Extension 100%    Lt Rotation 100%    Rt Rotation 100%    Lt Lateral Flexion 100%    Rt Lateral Flexion 100%    Lt Side Glide 100%    Rt Side Glide 100%     LE ROM   12/7/2023    LEFT RIGHT     Hip Flexion 100% 100%    Hip Extension 100% 100%    Hip Abduction 100% 100%    Hip Adduction 100% 100%        Knee Flexion 100% 100%*    Knee Extension 100% 100% Ankle % 100%    Ankle % 100%     (*) = reproduction of patients reported pain    LE MMT   12/7/2023    LEFT RIGHT     Hip Flexion 5/5 5/5    Hip Extension 5/5 5/5    Hip Abduction 5/5 5/5    Hip Adduction 5/5 5/5    Hip IR 5/5 5/5    Hip ER 5/5 5/5       Knee Flexion 5/5 5/5    Knee Extension 5/5 5/5       Ankle DF 5/5 5/5    Ankle PF 5/5 5/5    Ankle Inversion 5/5 5/5    Ankle Eversion 5/5 5/5       Great Toe Extension 5/5 5/5     (*) = reproduction of patient's reported pain    Palpation  (-) TTP of MCL, LCL, patellar tendon, quad tendon, popliteal fossa    Joint Mobility  Patella: WFL  Fibular head: WFL  Proximal tib-fib: WFL    Special Tests Performed       (-) valgus, varus, marleny, patellar compression    Flexibility   12/7/2023    LEFT RIGHT     Hamstring good good    Quads good good              Functional Assessment  Functional Squat: excellent, no change with patellar mobs  Gait Assessment: WNL           Insurance:  [employer]/ Sharlyn Ganser #/ Referral # Total units  Start date  Expiration date Extension  Visit limitation? PT only or  PT+OT? Co-Insurance   [facility] 12/7/2023                                                             POC Start Date POC Expiration Date Signed POC?   12/7/2023 4 weeks - 1/4/2024       Date               Units:  Used               Authed:  Remaining                  Precautions:   Past Medical History:   Diagnosis Date   • Depression    • Disease of thyroid gland    • Hyperlipidemia    • Hypothyroidism    • Polycystic ovary syndrome        Patient provided verbal consent to treatment plan and recommended interventions.     Date 12/7/2023        Visit Number IE        FOTO Tracking Intake Survey     Follow-up #1   Manual         Joint mobs         STM         Patella mobs                           TherEx         Active MORRISON         Knee PROM         Bridge progression         Squat progression         SL hip ABD         SAQ         LAQ Neuro Re-Ed         [employer]         Patient education                                             Gait Training         AD training                           Modalities         CP

## 2023-12-07 ENCOUNTER — EVALUATION (OUTPATIENT)
Dept: PHYSICAL THERAPY | Facility: CLINIC | Age: 33
End: 2023-12-07
Payer: COMMERCIAL

## 2023-12-07 DIAGNOSIS — M25.561 CHRONIC PAIN OF RIGHT KNEE: Primary | ICD-10-CM

## 2023-12-07 DIAGNOSIS — M79.672 LEFT FOOT PAIN: ICD-10-CM

## 2023-12-07 DIAGNOSIS — G89.29 CHRONIC PAIN OF RIGHT KNEE: Primary | ICD-10-CM

## 2023-12-07 PROCEDURE — 97161 PT EVAL LOW COMPLEX 20 MIN: CPT

## 2023-12-12 ENCOUNTER — TELEPHONE (OUTPATIENT)
Dept: INTERNAL MEDICINE CLINIC | Age: 33
End: 2023-12-12

## 2023-12-12 NOTE — TELEPHONE ENCOUNTER
Received fmla forms to be updated for patient. Scanning into media and placing on Dr Shweta Her. Once completed form fee of 15$ due.

## 2023-12-20 ENCOUNTER — OFFICE VISIT (OUTPATIENT)
Dept: INTERNAL MEDICINE CLINIC | Age: 33
End: 2023-12-20
Payer: COMMERCIAL

## 2023-12-20 VITALS
SYSTOLIC BLOOD PRESSURE: 128 MMHG | TEMPERATURE: 97.9 F | OXYGEN SATURATION: 98 % | BODY MASS INDEX: 35.99 KG/M2 | WEIGHT: 216 LBS | DIASTOLIC BLOOD PRESSURE: 74 MMHG | HEART RATE: 76 BPM | HEIGHT: 65 IN

## 2023-12-20 DIAGNOSIS — F41.1 GENERALIZED ANXIETY DISORDER: Primary | ICD-10-CM

## 2023-12-20 DIAGNOSIS — G47.09 OTHER INSOMNIA: ICD-10-CM

## 2023-12-20 DIAGNOSIS — F32.9 REACTIVE DEPRESSION: ICD-10-CM

## 2023-12-20 DIAGNOSIS — Z02.89 ENCOUNTER FOR COMPLETION OF FORM WITH PATIENT: ICD-10-CM

## 2023-12-20 DIAGNOSIS — F41.0 PANIC ATTACKS: ICD-10-CM

## 2023-12-20 PROCEDURE — 99215 OFFICE O/P EST HI 40 MIN: CPT | Performed by: INTERNAL MEDICINE

## 2023-12-20 RX ORDER — BUSPIRONE HYDROCHLORIDE 7.5 MG/1
7.5 TABLET ORAL 2 TIMES DAILY
Qty: 60 TABLET | Refills: 5 | Status: SHIPPED | OUTPATIENT
Start: 2023-12-20

## 2023-12-20 NOTE — PROGRESS NOTES
Assessment/Plan:    Generalized anxiety disorder  -Not optimally controlled  -Will start patient on buspirone 7.5 mg twice daily  -Follow-up in 1 month and will reevaluate patient and possibly increase the dose of the medication if needed.  -We will give patient a work note as requested.  -Follow-up with psychiatry and psychotherapy    Reactive depression  -Currently stable on Wellbutrin  -Continue with Wellbutrin  -Follow-up with psychiatry and psychotherapy    Insomnia  -Stable  -Continue with hydroxyzine as needed    Encounter  for completion of form with patient  -The form that was sent to the office is meant for patient's psychiatrist  -I discussed with her that the form has to go to her psychiatrist and not her primary care physician.     Diagnoses and all orders for this visit:    Generalized anxiety disorder  -     busPIRone (BUSPAR) 7.5 mg tablet; Take 1 tablet (7.5 mg total) by mouth 2 (two) times a day    Reactive depression    Encounter for completion of form with patient    Panic attacks    Other insomnia           Subjective:      Patient ID: Trinidad Knowles is a 33 y.o. female.    HPI  Patient presents for follow-up visit regarding her depression, anxiety, panic attacks.  She had FMLA leave for a while.  Of note, FMLA forms regarding her time off were recently sent to the office for completion.  She tells me that she was not aware that the forms were sent to our office.  Of note, the forms were meant for her psychiatrist.  She states that she has been doing okay till she went back to work 4 days ago.  She states that she was not able to find any other job during her time off.  She had planned to look for another job while she was on FMLA leave of absence.  States that she had a panic attack yesterday at work.  She states that she was told that she that she could  not take her vacation during the Christmas holiday and she had a panic attack and felt like her throat closed up and she could not  breathe.  She regrets going back to work there.  She states that she was initially okay when she went back till she was told that she could not go on vacation.  I questioned her about treatment with her psychiatrist and psychotherapist.  She states that she found a psychiatrist and cannot remember when she last saw her.  She states that she feels like she was not helped by this particular psychiatrist and her therapists.  She states that they were not helping her.  She went through 4 therapists at that practice and did not like them and she no longer wants to see them.  She states that she needs to have medication to be at work.  She states that the biggest problem she has right now is anxiety.  Has not taken Buspar in the past but wants it.  Has the atarax and states that it helps her to sleep.    The following portions of the patient's history were reviewed and updated as appropriate: She  has a past medical history of Depression, Disease of thyroid gland, Hyperlipidemia, Hypothyroidism, and Polycystic ovary syndrome.  She   Patient Active Problem List    Diagnosis Date Noted   • Encounter for completion of form with patient 12/20/2023   • Other insomnia 12/20/2023   • Other chest pain 06/08/2023   • Chronic pain of right knee 12/15/2022   • Left foot pain 12/15/2022   • Transient elevated blood pressure 12/15/2022   • Gastroesophageal reflux disease without esophagitis 06/16/2021   • PCOS (polycystic ovarian syndrome) 11/19/2020   • Tinea pedis 11/19/2020   • Gynecologic exam normal 11/19/2020   • Need for influenza vaccination 11/05/2020   • Influenza vaccination declined by patient 11/05/2020   • Other hyperlipidemia 07/29/2020   • Annual physical exam 07/15/2020   • BMI 36.0-36.9,adult 07/15/2020   • Reactive depression 07/15/2020   • Generalized anxiety disorder 07/15/2020   • Panic attacks 07/15/2020   • Endometrial polyp 11/12/2019   • Menorrhagia with irregular cycle 07/26/2019   • Irregular menses  07/26/2019   • Screening examination for STD (sexually transmitted disease) 07/26/2019   • Dyspareunia in female 07/26/2019   • Subclinical hypothyroidism 05/25/2018     She  has a past surgical history that includes [place] tooth extraction and pr hysteroscopy bx endometrium&/polypc w/wo d&c (N/A, 11/19/2019).  Her family history includes Alcohol abuse in her maternal grandfather; Arthritis in her maternal grandmother; Breast cancer in her maternal grandmother; Breast cancer additional onset in her maternal grandmother; Cancer in her maternal grandmother; Dementia in her paternal grandmother; Diabetes in her father; Hearing loss in her maternal grandmother; Hypertension in her mother; No Known Problems in her brother, paternal grandfather, and sister; Rheum arthritis in her maternal aunt; Thyroid disease in her maternal grandmother.  She  reports that she has never smoked. She has never used smokeless tobacco. She reports current alcohol use. She reports that she does not use drugs.  Current Outpatient Medications   Medication Sig Dispense Refill   • buPROPion (Wellbutrin XL) 150 mg 24 hr tablet Take 2 tablets (300 mg total) by mouth every morning 60 tablet 5   • busPIRone (BUSPAR) 7.5 mg tablet Take 1 tablet (7.5 mg total) by mouth 2 (two) times a day 60 tablet 5   • norethindrone-ethinyl estradiol (MICROGESTIN 1/20) 1-20 MG-MCG per tablet Take 1 tablet by mouth daily for 28 days 84 tablet 3     No current facility-administered medications for this visit.     Current Outpatient Medications on File Prior to Visit   Medication Sig   • buPROPion (Wellbutrin XL) 150 mg 24 hr tablet Take 2 tablets (300 mg total) by mouth every morning   • norethindrone-ethinyl estradiol (MICROGESTIN 1/20) 1-20 MG-MCG per tablet Take 1 tablet by mouth daily for 28 days     No current facility-administered medications on file prior to visit.     She has No Known Allergies..    Review of Systems   Constitutional:  Negative for activity  "change, chills, fatigue, fever and unexpected weight change.   HENT:  Negative for ear pain, postnasal drip, rhinorrhea, sinus pressure and sore throat.    Eyes:  Negative for pain.   Respiratory:  Negative for cough, choking, chest tightness, shortness of breath and wheezing.    Cardiovascular:  Negative for chest pain, palpitations and leg swelling.   Gastrointestinal:  Negative for abdominal pain, constipation, diarrhea, nausea and vomiting.   Genitourinary:  Negative for dysuria and hematuria.   Musculoskeletal:  Negative for arthralgias, back pain, gait problem, joint swelling, myalgias and neck stiffness.   Skin:  Negative for pallor and rash.   Neurological:  Negative for dizziness, tremors, seizures, syncope, light-headedness and headaches.   Hematological:  Negative for adenopathy.   Psychiatric/Behavioral:  Positive for dysphoric mood and sleep disturbance. Negative for behavioral problems. The patient is nervous/anxious.          Objective:      /74 (BP Location: Left arm, Patient Position: Sitting, Cuff Size: Adult)   Pulse 76   Temp 97.9 °F (36.6 °C) (Temporal)   Ht 5' 5\" (1.651 m)   Wt 98 kg (216 lb)   SpO2 98% Comment: ra  BMI 35.94 kg/m²          Physical Exam  Constitutional:       General: She is not in acute distress.     Appearance: She is well-developed. She is not diaphoretic.   HENT:      Head: Normocephalic and atraumatic.      Right Ear: External ear normal.      Left Ear: External ear normal.      Nose: Nose normal.      Mouth/Throat:      Mouth: Mucous membranes are dry.      Pharynx: Posterior oropharyngeal erythema (Oropharyngeal erythema with dry mucous membranes) present. No oropharyngeal exudate.   Eyes:      General: No scleral icterus.        Right eye: No discharge.         Left eye: No discharge.      Conjunctiva/sclera: Conjunctivae normal.      Pupils: Pupils are equal, round, and reactive to light.   Neck:      Thyroid: No thyromegaly.      Vascular: No JVD.      " Trachea: No tracheal deviation.   Cardiovascular:      Rate and Rhythm: Normal rate and regular rhythm.      Heart sounds: Normal heart sounds. No murmur heard.     No friction rub. No gallop.   Pulmonary:      Effort: Pulmonary effort is normal. No respiratory distress.      Breath sounds: Normal breath sounds. No wheezing or rales.   Chest:      Chest wall: No tenderness.   Abdominal:      General: Bowel sounds are normal. There is no distension.      Palpations: Abdomen is soft. There is no mass.      Tenderness: There is no abdominal tenderness. There is no guarding or rebound.   Musculoskeletal:         General: No tenderness or deformity. Normal range of motion.      Cervical back: Normal range of motion and neck supple.   Lymphadenopathy:      Cervical: No cervical adenopathy.   Skin:     General: Skin is warm and dry.      Coloration: Skin is not pale.      Findings: No erythema or rash.   Neurological:      Mental Status: She is alert and oriented to person, place, and time.      Cranial Nerves: No cranial nerve deficit.      Motor: No abnormal muscle tone.      Coordination: Coordination normal.      Deep Tendon Reflexes: Reflexes are normal and symmetric.   Psychiatric:         Mood and Affect: Mood is anxious (Mildly anxious affect). Mood is not depressed.         Behavior: Behavior normal.           Appointment on 08/14/2023   Component Date Value Ref Range Status   • WBC 08/14/2023 6.49  4.31 - 10.16 Thousand/uL Final   • RBC 08/14/2023 4.72  3.81 - 5.12 Million/uL Final   • Hemoglobin 08/14/2023 13.7  11.5 - 15.4 g/dL Final   • Hematocrit 08/14/2023 43.1  34.8 - 46.1 % Final   • MCV 08/14/2023 91  82 - 98 fL Final   • MCH 08/14/2023 29.0  26.8 - 34.3 pg Final   • MCHC 08/14/2023 31.8  31.4 - 37.4 g/dL Final   • RDW 08/14/2023 13.3  11.6 - 15.1 % Final   • MPV 08/14/2023 10.5  8.9 - 12.7 fL Final   • Platelets 08/14/2023 376  149 - 390 Thousands/uL Final   • nRBC 08/14/2023 0  /100 WBCs Final   •  Neutrophils Relative 08/14/2023 54  43 - 75 % Final   • Immat GRANS % 08/14/2023 0  0 - 2 % Final   • Lymphocytes Relative 08/14/2023 35  14 - 44 % Final   • Monocytes Relative 08/14/2023 8  4 - 12 % Final   • Eosinophils Relative 08/14/2023 2  0 - 6 % Final   • Basophils Relative 08/14/2023 1  0 - 1 % Final   • Neutrophils Absolute 08/14/2023 3.53  1.85 - 7.62 Thousands/µL Final   • Immature Grans Absolute 08/14/2023 0.01  0.00 - 0.20 Thousand/uL Final   • Lymphocytes Absolute 08/14/2023 2.27  0.60 - 4.47 Thousands/µL Final   • Monocytes Absolute 08/14/2023 0.50  0.17 - 1.22 Thousand/µL Final   • Eosinophils Absolute 08/14/2023 0.15  0.00 - 0.61 Thousand/µL Final   • Basophils Absolute 08/14/2023 0.03  0.00 - 0.10 Thousands/µL Final   • TSH 3RD GENERATON 08/14/2023 3.696  0.450 - 4.500 uIU/mL Final    The recommended reference ranges for TSH during pregnancy are as follows:   First trimester 0.1 to 2.5 uIU/mL   Second trimester  0.2 to 3.0 uIU/mL   Third trimester 0.3 to 3.0 uIU/m    Note: Normal ranges may not apply to patients who are transgender, non-binary, or whose legal sex, sex at birth, and gender identity differ.  Adult TSH (3rd generation) reference range follows the recommended guidelines of the American Thyroid Association, January, 2020.   • Sodium 08/14/2023 136  135 - 147 mmol/L Final   • Potassium 08/14/2023 3.8  3.5 - 5.3 mmol/L Final   • Chloride 08/14/2023 107  96 - 108 mmol/L Final   • CO2 08/14/2023 24  21 - 32 mmol/L Final   • ANION GAP 08/14/2023 5  mmol/L Final   • BUN 08/14/2023 14  5 - 25 mg/dL Final   • Creatinine 08/14/2023 1.06  0.60 - 1.30 mg/dL Final    Standardized to IDMS reference method   • Glucose, Fasting 08/14/2023 82  65 - 99 mg/dL Final    Specimen collection should occur prior to Sulfasalazine administration due to the potential for falsely depressed results. Specimen collection should occur prior to Sulfapyridine administration due to the potential for falsely elevated  results.   • Calcium 08/14/2023 9.4  8.3 - 10.1 mg/dL Final   • AST 08/14/2023 16  5 - 45 U/L Final    Specimen collection should occur prior to Sulfasalazine administration due to the potential for falsely depressed results.    • ALT 08/14/2023 24  12 - 78 U/L Final    Specimen collection should occur prior to Sulfasalazine and/or Sulfapyridine administration due to the potential for falsely depressed results.    • Alkaline Phosphatase 08/14/2023 78  46 - 116 U/L Final   • Total Protein 08/14/2023 8.2  6.4 - 8.4 g/dL Final   • Albumin 08/14/2023 3.5  3.5 - 5.0 g/dL Final   • Total Bilirubin 08/14/2023 0.41  0.20 - 1.00 mg/dL Final    Use of this assay is not recommended for patients undergoing treatment with eltrombopag due to the potential for falsely elevated results.   • eGFR 08/14/2023 70  ml/min/1.73sq m Final   • Cholesterol 08/14/2023 216 (H)  See Comment mg/dL Final    Cholesterol:         Pediatric <18 Years        Desirable          <170 mg/dL      Borderline High    170-199 mg/dL      High               >=200 mg/dL        Adult >=18 Years            Desirable         <200 mg/dL      Borderline High   200-239 mg/dL      High              >239 mg/dL     • Triglycerides 08/14/2023 109  See Comment mg/dL Final    Triglyceride:     0-9Y            <75mg/dL     10Y-17Y         <90 mg/dL       >=18Y     Normal          <150 mg/dL     Borderline High 150-199 mg/dL     High            200-499 mg/dL        Very High       >499 mg/dL    Specimen collection should occur prior to N-Acetylcysteine or Metamizole administration due to the potential for falsely depressed results.   • HDL, Direct 08/14/2023 59  >=50 mg/dL Final    Specimen collection should occur prior to Metamizole administration due to the potential for falsley depressed results.   • LDL Calculated 08/14/2023 135 (H)  0 - 100 mg/dL Final    LDL Cholesterol:     Optimal           <100 mg/dl     Near Optimal      100-129 mg/dl     Above Optimal        Borderline High 130-159 mg/dl       High            160-189 mg/dl       Very High       >189 mg/dl         This screening LDL is a calculated result.   It does not have the accuracy of the Direct Measured LDL in the monitoring of patients with hyperlipidemia and/or statin therapy.   Direct Measure LDL ([id number]) must be ordered separately in these patients.   • Non-HDL-Chol (CHOL-HDL) 08/14/2023 157  mg/dl Final

## 2023-12-20 NOTE — LETTER
December 20, 2023     Patient: Trinidad Knowles  YOB: 1990  Date of Visit: 12/20/2023      To Whom it May Concern:    Trinidad Knowles is under my professional care. Trinidad was seen in my office on 12/20/2023. Trinidad may return to work on 12/21/2023 .    If you have any questions or concerns, please don't hesitate to call.         Sincerely,          Lorri Carey DO        CC: No Recipients

## 2024-01-13 DIAGNOSIS — E28.2 PCOS (POLYCYSTIC OVARIAN SYNDROME): ICD-10-CM

## 2024-01-13 DIAGNOSIS — N91.2 AMENORRHEA: ICD-10-CM

## 2024-01-13 DIAGNOSIS — N92.6 IRREGULAR MENSES: ICD-10-CM

## 2024-01-15 RX ORDER — NORETHINDRONE ACETATE AND ETHINYL ESTRADIOL 1; 20 MG/1; UG/1
1 TABLET ORAL DAILY
Qty: 84 TABLET | Refills: 3 | Status: SHIPPED | OUTPATIENT
Start: 2024-01-15

## 2024-02-21 PROBLEM — Z01.419 GYNECOLOGIC EXAM NORMAL: Status: RESOLVED | Noted: 2020-11-19 | Resolved: 2024-02-21

## 2024-02-21 PROBLEM — Z11.3 SCREENING EXAMINATION FOR STD (SEXUALLY TRANSMITTED DISEASE): Status: RESOLVED | Noted: 2019-07-26 | Resolved: 2024-02-21

## 2024-04-18 ENCOUNTER — CONSULT (OUTPATIENT)
Dept: DERMATOLOGY | Facility: CLINIC | Age: 34
End: 2024-04-18
Payer: COMMERCIAL

## 2024-04-18 VITALS — HEIGHT: 65 IN | BODY MASS INDEX: 35.99 KG/M2 | TEMPERATURE: 97.4 F | WEIGHT: 216 LBS

## 2024-04-18 DIAGNOSIS — L82.1 DERMATOSIS PAPULOSA NIGRA: ICD-10-CM

## 2024-04-18 DIAGNOSIS — L30.5 PITYRIASIS ALBA: Primary | ICD-10-CM

## 2024-04-18 PROCEDURE — 99204 OFFICE O/P NEW MOD 45 MIN: CPT | Performed by: DERMATOLOGY

## 2024-04-18 RX ORDER — TACROLIMUS 1 MG/G
OINTMENT TOPICAL
Qty: 30 G | Refills: 11 | Status: SHIPPED | OUTPATIENT
Start: 2024-04-18

## 2024-04-18 NOTE — PATIENT INSTRUCTIONS
PITYRIASIS ALBA    Physical Exam:  Anatomic Location Affected:  Upper eyelids, around mouth  Morphological Description:  hypopigmented patch  Pertinent Positives: pics taken for chart today.   Pertinent Negatives:    Additional History of Present Condition:  Discoloration around mouth and eyes worse in the last year. No treatment attempted. No rash, no itch. No hx of eczema. Has grown in size.    Assessment and Plan:  Based on a thorough discussion of this condition and the management approach to it (including a comprehensive discussion of the known risks, side effects and potential benefits of treatment), the patient (family) agrees to implement the following specific plan:  Tacrolimus 0.1% Ointment: Apply topically twice a day to affected areas on face. If medication causes burning you can mix medication with plain Vaseline. This medication is not a steroid and is safe to apply to your face. If medication is not working please send a message via My Chart. Also discussed that if worsening symptoms we can consider a punch biopsy in the future.   When outside we recommend sunscreen with SPF 30+ with reapplication every 2 hours, or SPF specific clothing      What is pityriasis alba?  Pityriasis alba is a low-grade type of eczema/dermatitis that primarily affects children.  The name refers to its appearance: pityriasis refers to its characteristic fine scale, and alba to its pale colour (hypopigmentation).    Who gets pityriasis alba?  Pityriasis alba is common worldwide with a prevalence in children of around 5%.  It mainly affects children and adolescents aged 3 to 16 years, but may also arise in older and younger people   It affects boys and girls equally.   It is more prominent, and may also be more prevalent, in dark skin compared to white skin.    What causes pityriasis alba?  The cause of pityriasis alba is unknown.  It often coexists with dry skin and atopic dermatitis.   It often presents following sun  exposure, perhaps because tanning of surrounding skin makes affected areas more prominent.  Researchers have not reached any conclusions about the relationship of pityriasis alba to the following:  Ultraviolet radiation   Excessive or inadequate bathing   Low levels of serum copper   Malassezia yeasts (which produce a metabolite, pityriacitrin, that inhibits tyrosinase thus causing hypopigmentation)    What are the clinical features of pityriasis alba?  Classic pityriasis alba usually presents with 1 to 20 patches or thin plaques.  Most lesions occur on the face, especially on cheeks and chin.   They may also arise on neck, shoulders and upper arm and are uncommon on other sites of the body.   Size varies from 0.5 to 5 cm in diameter.   They are round, oval or irregular in shape.   Pityriasis alba may have well-demarcated or poorly defined edges.   Itch is minimal or absent.   Hypopigmentation is more noticeable in summer, especially in dark-skinned children.   Dryness and scaling is more noticeable in winter, when environmental humidity tends to be lower.    Typically, each area of pityriasis alba goes through several stages.  Slightly scaly pink plaque with just palpable papular surface  Hypopigmented plaque with fine surface scale  Then post-inflammatory hypopigmented macule without scale  Resolution    How is pityriasis alba diagnosed?  Pityriasis alba can be confused with several other disorders that cause hypopigmentation.  To exclude these, investigations may include:  Wood lamp examination: hypopigmentation does not enhance, and there is no fluorescence in pityriasis alba   Scrapings for mycology: microscopy and fungal culture are negative in pityriasis alba   Skin biopsy: biopsy is rarely required, but may reveal mildly spongiotic dermatitis and reduction in melanin    What is the treatment for pityriasis alba?  No treatment is necessary for asymptomatic pityriasis alba.  A moisturising cream may improve  the dry appearance   A mild topical steroid (hydrocortisone) cream may reduce redness and itch   Calcineurin inhibitors, pimecrolimus cream and tacrolimus ointment, may be as effective as hydrocortisone and have been reported to speed recovery of skin color.    How can pityriasis alba be prevented?  The development or prominence of pityriasis alba can be reduced by avoiding exposure to sunlight.    What is the outlook for pityriasis alba?  Pityriasis clears up after a few months, or in some cases persists for up to two or three years. The color gradually returns completely to normal.   .  DERMATOSIS PAPULOSA NIGRA    Physical Exam:  Anatomic Location Affected:  Cheeks  Morphological Description:  brown papules  Pertinent Positives:  Pertinent Negatives:    Additional History of Present Condition:  Present on exam    Assessment and Plan:  Based on a thorough discussion of this condition and the management approach to it (including a comprehensive discussion of the known risks, side effects and potential benefits of treatment), the patient (family) agrees to implement the following specific plan:  Discussed that treatment is considered cosmetic and the cost is $150 to treat up to 10 lesions, and if over 10 lesions, then additional $10/lesion thereafter. We would treat with hyfrecator.  Recommend applying topical Lidocaine before leaving your house the day of the procedure.     What is dermatosis papulosa nigra?  Dermatosis papulosa nigra describes the presence of multiple, small, 1-5 mm diameter, smooth, firm, black or dark brown papules on face and neck.    Who gets dermatosis papulosa nigra?  Dermatosis papulosa nigra is common in people with skin of color, with Dahl skin phototype 4, 5 or 6. It affects up to 35% of American blacks. There is a lower frequency in  Americans with a fairer complexion. Dermatosis papulosa nigra also occurs among dark-skinned Asians and Polynesians, but the exact frequency  is unknown. Females are more frequently affected than males.    Dermatosis papulosa nigra usually begins in adolescence. The incidence, number and size of lesions increases with age.    What is the cause of dermatosis papulosa nigra?  The papules of dermatosis papulosa nigra are identical to small seborrhoeic keratoses.    Dermatosis papulosa nigra is likely to be genetically determined with 40-50% of patients having a family history. It is believed to be due to a naevoid developmental defect of the hair follicle.    What are the clinical features of dermatosis papulosa nigra?  Dark coloured papules arise mainly on the cheeks and forehead but they may also be found on the neck, upper back and chest. Scaling, crusting and ulceration do not occur.  The papules are symptomless but may be regarded as unsightly.    How is dermatosis papulosa nigra diagnosed?  No tests are needed as dermatosis papulosa nigra is diagnosed clinically. If there is any doubt a skin biopsy can be taken. Histology shows a seborrhoeic keratosis with markedly increased pigmentation of the basal layer of the epidermis.    How is dermatosis papulosa nigra treated?  Dermatosis papulosa nigra lesions are generally best left untreated. Complications of locally destructive treatment can include increased and decreased pigmentation, scarring and keloid formation.    Treatment choices include curettage, freezing with liquid nitrogen (cryotherapy) and electrodessication followed by curettage. Nd:YAG laser has also recently been reported to achieve excellent cosmetic results. Treatment is kept superficial to minimise the risk of complications.

## 2024-04-18 NOTE — PROGRESS NOTES
"[facility] Dermatology Clinic Note     Patient Name: Trinidad Knowles  Encounter Date: 4/18/2024     Have you been cared for by a [facility] Dermatologist in the last 3 years and, if so, which description applies to you?    NO.   I am considered a \"new\" patient and must complete all patient intake questions. I am FEMALE/of child-bearing potential.    REVIEW OF SYSTEMS:  Have you recently had or currently have any of the following? Recent fever or chills? No  Any non-healing wound? No  Are you pregnant or planning to become pregnant? No  Are you currently or planning to be nursing or breast feeding? No   PAST MEDICAL HISTORY:  Have you personally ever had or currently have any of the following?  If \"YES,\" then please provide more detail. Skin cancer (such as Melanoma, Basal Cell Carcinoma, Squamous Cell Carcinoma?  No  Tuberculosis, HIV/AIDS, Hepatitis B or C: No  Radiation Treatment No   HISTORY OF IMMUNOSUPPRESSION:   Do you have a history of any of the following:  Systemic Immunosuppression such as Diabetes, Biologic or Immunotherapy, Chemotherapy, Organ Transplantation, Bone Marrow Transplantation?  No    Answering \"YES\" requires the addition of the dotphrase \"IMMUNOSUPPRESSED\" as the first diagnosis of the patient's visit.   FAMILY HISTORY:  Any \"first degree relatives\" (parent, brother, sister, or child) with the following?    Skin Cancer, Pancreatic or Other Cancer? No   PATIENT EXPERIENCE:    Do you want the Dermatologist to perform a COMPLETE skin exam today including a clinical examination under the \"bra and underwear\" areas?  NO  If necessary, do we have your permission to call and leave a detailed message on your Preferred Phone number that includes your specific medical information?  Yes      No Known Allergies   Current Outpatient Medications:     buPROPion (Wellbutrin XL) 150 mg 24 hr tablet, Take 2 tablets (300 mg total) by mouth every morning, Disp: 60 tablet, Rfl: 5    busPIRone (BUSPAR) 7.5 mg " tablet, Take 1 tablet (7.5 mg total) by mouth 2 (two) times a day, Disp: 60 tablet, Rfl: 5    norethindrone-ethinyl estradiol (Junel 1/20) 1-20 MG-MCG per tablet, TAKE ONE TABLET BY MOUTH EVERY DAY AS DIRECTED, Disp: 84 tablet, Rfl: 3          Whom besides the patient is providing clinical information about today's encounter?   NO ADDITIONAL HISTORIAN (patient alone provided history)    Physical Exam and Assessment/Plan by Diagnosis:    CHIEF COMPLAINT    34 year old male or female patient presents today for New Patient.  Patient has No history of skin cancer. Discoloration around mouth and eyes worse in the last year. No treatment attempted. No rash, no itch. No hx of eczema. Has grown in size.     PITYRIASIS ALBA    Physical Exam:  Anatomic Location Affected:  Upper eyelids, around mouth  Morphological Description:  hypopigmented patch  Pertinent Positives: pics taken for chart today.   Pertinent Negatives:    Additional History of Present Condition:  Discoloration around mouth and eyes worse in the last year. No treatment attempted. No rash, no itch. No hx of eczema. Has grown in size.    Assessment and Plan:  Based on a thorough discussion of this condition and the management approach to it (including a comprehensive discussion of the known risks, side effects and potential benefits of treatment), the patient (family) agrees to implement the following specific plan:  Tacrolimus 0.1% Ointment: Apply topically twice a day to affected areas on face. If medication causes burning you can mix medication with plain Vaseline. This medication is not a steroid and is safe to apply to your face. If medication is not working please send a message via My Chart. Also discussed that if worsening symptoms we can consider a punch biopsy in the future.   When outside we recommend sunscreen with SPF 30+ with reapplication every 2 hours, or SPF specific clothing      What is pityriasis alba?  Pityriasis alba is a low-grade type of  eczema/dermatitis that primarily affects children.  The name refers to its appearance: pityriasis refers to its characteristic fine scale, and alba to its pale colour (hypopigmentation).    Who gets pityriasis alba?  Pityriasis alba is common worldwide with a prevalence in children of around 5%.  It mainly affects children and adolescents aged 3 to 16 years, but may also arise in older and younger people   It affects boys and girls equally.   It is more prominent, and may also be more prevalent, in dark skin compared to white skin.    What causes pityriasis alba?  The cause of pityriasis alba is unknown.  It often coexists with dry skin and atopic dermatitis.   It often presents following sun exposure, perhaps because tanning of surrounding skin makes affected areas more prominent.  Researchers have not reached any conclusions about the relationship of pityriasis alba to the following:  Ultraviolet radiation   Excessive or inadequate bathing   Low levels of serum copper   Malassezia yeasts (which produce a metabolite, pityriacitrin, that inhibits tyrosinase thus causing hypopigmentation)    What are the clinical features of pityriasis alba?  Classic pityriasis alba usually presents with 1 to 20 patches or thin plaques.  Most lesions occur on the face, especially on cheeks and chin.   They may also arise on neck, shoulders and upper arm and are uncommon on other sites of the body.   Size varies from 0.5 to 5 cm in diameter.   They are round, oval or irregular in shape.   Pityriasis alba may have well-demarcated or poorly defined edges.   Itch is minimal or absent.   Hypopigmentation is more noticeable in summer, especially in dark-skinned children.   Dryness and scaling is more noticeable in winter, when environmental humidity tends to be lower.    Typically, each area of pityriasis alba goes through several stages.  Slightly scaly pink plaque with just palpable papular surface  Hypopigmented plaque with fine surface  scale  Then post-inflammatory hypopigmented macule without scale  Resolution    How is pityriasis alba diagnosed?  Pityriasis alba can be confused with several other disorders that cause hypopigmentation.  To exclude these, investigations may include:  Wood lamp examination: hypopigmentation does not enhance, and there is no fluorescence in pityriasis alba   Scrapings for mycology: microscopy and fungal culture are negative in pityriasis alba   Skin biopsy: biopsy is rarely required, but may reveal mildly spongiotic dermatitis and reduction in melanin    What is the treatment for pityriasis alba?  No treatment is necessary for asymptomatic pityriasis alba.  A moisturising cream may improve the dry appearance   A mild topical steroid (hydrocortisone) cream may reduce redness and itch   Calcineurin inhibitors, pimecrolimus cream and tacrolimus ointment, may be as effective as hydrocortisone and have been reported to speed recovery of skin color.    How can pityriasis alba be prevented?  The development or prominence of pityriasis alba can be reduced by avoiding exposure to sunlight.    What is the outlook for pityriasis alba?  Pityriasis clears up after a few months, or in some cases persists for up to two or three years. The color gradually returns completely to normal.   .  DERMATOSIS PAPULOSA NIGRA    Physical Exam:  Anatomic Location Affected:  Cheeks  Morphological Description:  brown papules  Pertinent Positives:  Pertinent Negatives:    Additional History of Present Condition:  Present on exam    Assessment and Plan:  Based on a thorough discussion of this condition and the management approach to it (including a comprehensive discussion of the known risks, side effects and potential benefits of treatment), the patient (family) agrees to implement the following specific plan:  Discussed that treatment is considered cosmetic and the cost is $150 to treat up to 10 lesions, and if over 10 lesions, then additional  $10/lesion thereafter. We would treat with hyfrecator.  Recommend applying topical Lidocaine before leaving your house the day of the procedure.     What is dermatosis papulosa nigra?  Dermatosis papulosa nigra describes the presence of multiple, small, 1-5 mm diameter, smooth, firm, black or dark brown papules on face and neck.    Who gets dermatosis papulosa nigra?  Dermatosis papulosa nigra is common in people with skin of color, with Dahl skin phototype 4, 5 or 6. It affects up to 35% of American blacks. There is a lower frequency in  Americans with a fairer complexion. Dermatosis papulosa nigra also occurs among dark-skinned Asians and Polynesians, but the exact frequency is unknown. Females are more frequently affected than males.    Dermatosis papulosa nigra usually begins in adolescence. The incidence, number and size of lesions increases with age.    What is the cause of dermatosis papulosa nigra?  The papules of dermatosis papulosa nigra are identical to small seborrhoeic keratoses.    Dermatosis papulosa nigra is likely to be genetically determined with 40-50% of patients having a family history. It is believed to be due to a naevoid developmental defect of the hair follicle.    What are the clinical features of dermatosis papulosa nigra?  Dark coloured papules arise mainly on the cheeks and forehead but they may also be found on the neck, upper back and chest. Scaling, crusting and ulceration do not occur.  The papules are symptomless but may be regarded as unsightly.    How is dermatosis papulosa nigra diagnosed?  No tests are needed as dermatosis papulosa nigra is diagnosed clinically. If there is any doubt a skin biopsy can be taken. Histology shows a seborrhoeic keratosis with markedly increased pigmentation of the basal layer of the epidermis.    How is dermatosis papulosa nigra treated?  Dermatosis papulosa nigra lesions are generally best left untreated. Complications of locally  destructive treatment can include increased and decreased pigmentation, scarring and keloid formation.    Treatment choices include curettage, freezing with liquid nitrogen (cryotherapy) and electrodessication followed by curettage. Nd:YAG laser has also recently been reported to achieve excellent cosmetic results. Treatment is kept superficial to minimise the risk of complications.       Scribe Attestation      I,:  Bruna Edwards am acting as a scribe while in the presence of the attending physician.:       I,:  Jessica Toussaint MD personally performed the services described in this documentation    as scribed in my presence.:

## 2024-05-01 DIAGNOSIS — F32.9 REACTIVE DEPRESSION: ICD-10-CM

## 2024-05-01 RX ORDER — BUPROPION HYDROCHLORIDE 150 MG/1
300 TABLET ORAL EVERY MORNING
Qty: 60 TABLET | Refills: 0 | Status: SHIPPED | OUTPATIENT
Start: 2024-05-01

## 2024-06-12 DIAGNOSIS — F32.9 REACTIVE DEPRESSION: ICD-10-CM

## 2024-06-13 DIAGNOSIS — F32.9 REACTIVE DEPRESSION: ICD-10-CM

## 2024-06-13 RX ORDER — BUPROPION HYDROCHLORIDE 150 MG/1
300 TABLET ORAL EVERY MORNING
Qty: 60 TABLET | Refills: 0 | Status: SHIPPED | OUTPATIENT
Start: 2024-06-13

## 2024-06-13 RX ORDER — BUPROPION HYDROCHLORIDE 150 MG/1
300 TABLET ORAL EVERY MORNING
Qty: 60 TABLET | Refills: 0 | Status: SHIPPED | OUTPATIENT
Start: 2024-06-13 | End: 2024-06-13 | Stop reason: SDUPTHER

## 2024-06-13 NOTE — TELEPHONE ENCOUNTER
Pharmacy called requesting prescription be canceled from [facility] and resent to them  Reason for call:   [x] Refill   [] Prior Auth  [] Other:     Office:   [x] PCP/Provider - Aurelio  [] Specialty/Provider -     Medication: Bupropion 150mg    Dose/Frequency: 2 tabs am    Quantity: 60    Pharmacy: [id number] - [place], [place] - [address] [place] [address] [phone]     Does the patient have enough for 3 days?   [] Yes   [x] No - Send as HP to POD

## 2024-07-22 DIAGNOSIS — F32.9 REACTIVE DEPRESSION: ICD-10-CM

## 2024-07-22 RX ORDER — BUPROPION HYDROCHLORIDE 150 MG/1
300 TABLET ORAL EVERY MORNING
Qty: 60 TABLET | Refills: 5 | Status: SHIPPED | OUTPATIENT
Start: 2024-07-22

## 2024-09-08 DIAGNOSIS — N92.6 IRREGULAR MENSES: ICD-10-CM

## 2024-09-08 DIAGNOSIS — E28.2 PCOS (POLYCYSTIC OVARIAN SYNDROME): ICD-10-CM

## 2024-09-08 DIAGNOSIS — N91.2 AMENORRHEA: ICD-10-CM

## 2024-09-09 RX ORDER — NORETHINDRONE ACETATE AND ETHINYL ESTRADIOL 1; 20 MG/1; UG/1
1 TABLET ORAL DAILY
Qty: 84 TABLET | Refills: 3 | Status: SHIPPED | OUTPATIENT
Start: 2024-09-09

## 2024-09-09 NOTE — TELEPHONE ENCOUNTER
Tried to call pt no vm set up to leave message, will send MYC message for pt to contact us to set up APE

## 2025-02-19 ENCOUNTER — TELEPHONE (OUTPATIENT)
Dept: INTERNAL MEDICINE CLINIC | Age: 35
End: 2025-02-19

## (undated) DEVICE — TUBE INFLOW F/FLUID CONTROL SYSTEM AQUILEX

## (undated) DEVICE — STERILE SURGICAL LUBRICANT,  TUBE: Brand: SURGILUBE

## (undated) DEVICE — TUBE OUTFLOW F/FLUID CONTROL SYSTEM AQUILEX

## (undated) DEVICE — DEVICE MYOSURE TISSUE REMOVAL HYSTEROSCOPIC

## (undated) DEVICE — UNDER BUTTOCKS DRAPE W/FLUID CONTROL POUCH: Brand: CONVERTORS

## (undated) DEVICE — BETHLEHEM UNIVERSAL MINOR VAG: Brand: CARDINAL HEALTH

## (undated) DEVICE — GLOVE PI ULTRA TOUCH SZ.7.0

## (undated) DEVICE — LIGHT HANDLE COVER SLEEVE DISP BLUE STELLAR

## (undated) DEVICE — SYRINGE CATH TIP 50ML

## (undated) DEVICE — GLOVE INDICATOR PI UNDERGLOVE SZ 7 BLUE

## (undated) DEVICE — PREMIUM DRY TRAY LF: Brand: MEDLINE INDUSTRIES, INC.

## (undated) DEVICE — MAYO STAND COVER: Brand: CONVERTORS

## (undated) DEVICE — MYOSURE SEAL SET